# Patient Record
Sex: FEMALE | Race: WHITE | NOT HISPANIC OR LATINO | Employment: FULL TIME | URBAN - METROPOLITAN AREA
[De-identification: names, ages, dates, MRNs, and addresses within clinical notes are randomized per-mention and may not be internally consistent; named-entity substitution may affect disease eponyms.]

---

## 2020-09-23 ENCOUNTER — TRANSCRIBE ORDERS (OUTPATIENT)
Dept: ADMINISTRATIVE | Facility: HOSPITAL | Age: 55
End: 2020-09-23

## 2020-09-23 DIAGNOSIS — Z12.31 ENCOUNTER FOR SCREENING MAMMOGRAM FOR MALIGNANT NEOPLASM OF BREAST: Primary | ICD-10-CM

## 2020-09-28 ENCOUNTER — OFFICE VISIT (OUTPATIENT)
Dept: URGENT CARE | Facility: CLINIC | Age: 55
End: 2020-09-28
Payer: COMMERCIAL

## 2020-09-28 VITALS
HEIGHT: 65 IN | HEART RATE: 73 BPM | TEMPERATURE: 97.5 F | DIASTOLIC BLOOD PRESSURE: 85 MMHG | OXYGEN SATURATION: 97 % | WEIGHT: 194 LBS | BODY MASS INDEX: 32.32 KG/M2 | SYSTOLIC BLOOD PRESSURE: 157 MMHG | RESPIRATION RATE: 18 BRPM

## 2020-09-28 DIAGNOSIS — L30.9 ECZEMA OF BOTH HANDS: Primary | ICD-10-CM

## 2020-09-28 PROCEDURE — 99213 OFFICE O/P EST LOW 20 MIN: CPT | Performed by: PREVENTIVE MEDICINE

## 2020-09-28 RX ORDER — ELECTROLYTES/DEXTROSE
SOLUTION, ORAL ORAL
COMMUNITY

## 2020-09-28 RX ORDER — NICOTINE POLACRILEX 2 MG
1 GUM BUCCAL
COMMUNITY

## 2020-09-28 RX ORDER — TRIAMCINOLONE ACETONIDE 1 MG/G
CREAM TOPICAL 2 TIMES DAILY
Qty: 30 G | Refills: 0 | Status: SHIPPED | OUTPATIENT
Start: 2020-09-28

## 2020-09-28 RX ORDER — CEPHALEXIN 500 MG/1
500 CAPSULE ORAL EVERY 8 HOURS SCHEDULED
Qty: 15 CAPSULE | Refills: 0 | Status: SHIPPED | OUTPATIENT
Start: 2020-09-28 | End: 2020-10-03

## 2020-09-28 NOTE — PROGRESS NOTES
[facility]        NAME: Faisal Guerrero is a 63 y o  female  : 1965    MRN: [id number]  DATE: 2020  TIME: 5:16 PM    Assessment and Plan   Eczema of both hands [L30 9]  1  Eczema of both hands  triamcinolone (KENALOG) 0 1 % cream    cephalexin (KEFLEX) 500 mg capsule         Patient Instructions       Follow up with PCP in 3-5 days  Proceed to  ER if symptoms worsen  Chief Complaint     Chief Complaint   Patient presents with    blister     blister on L middle finger x 3days  Unable to bend finger  Draining yellowish pus earlier today  History of Present Illness       Blistering rash on both hands times 5-10 days  History of eczema in the past       Review of Systems   Review of Systems   Skin: Positive for rash  Current Medications       Current Outpatient Medications:     Biotin 1 MG CAPS, Take 1 mg by mouth, Disp: , Rfl:     Cranberry 475 MG CAPS, Take by mouth, Disp: , Rfl:     Multiple Vitamins-Minerals (Multivitamin Adult) TABS, Take by mouth, Disp: , Rfl:     cephalexin (KEFLEX) 500 mg capsule, Take 1 capsule (500 mg total) by mouth every 8 (eight) hours for 5 days, Disp: 15 capsule, Rfl: 0    triamcinolone (KENALOG) 0 1 % cream, Apply topically 2 (two) times a day, Disp: 30 g, Rfl: 0    Current Allergies     Allergies as of 2020    (No Known Allergies)            The following portions of the patient's history were reviewed and updated as appropriate: allergies, current medications, past family history, past medical history, past social history, past surgical history and problem list      Past Medical History:   Diagnosis Date    Known health problems: none        Past Surgical History:   Procedure Laterality Date     SECTION      WISDOM TOOTH EXTRACTION         Family History   Problem Relation Age of Onset    Hernia Mother          Medications have been verified          Objective   /85   Pulse 73   Temp 97 5 °F (36 4 °C) Resp 18   Ht 5' 5" (1 651 m)   Wt 88 kg (194 lb)   SpO2 97%   BMI 32 28 kg/m²        Physical Exam     Physical Exam  Skin:     Comments: The rash on both hands and fingers consists of several bull eye, looks like small ruptured bulla, and dry erythematous papular lesions    See attached pictures

## 2020-09-29 ENCOUNTER — HOSPITAL ENCOUNTER (OUTPATIENT)
Dept: RADIOLOGY | Facility: HOSPITAL | Age: 55
Discharge: HOME | End: 2020-09-29
Attending: RADIOLOGY

## 2020-09-29 DIAGNOSIS — Z71.2 PERSON CONSULTING FOR EXPLANATION OF EXAMINATION OR TEST FINDING: ICD-10-CM

## 2020-10-05 ENCOUNTER — HOSPITAL ENCOUNTER (OUTPATIENT)
Dept: RADIOLOGY | Facility: CLINIC | Age: 55
Discharge: HOME | End: 2020-10-05
Payer: COMMERCIAL

## 2020-10-05 VITALS — WEIGHT: 194 LBS | HEIGHT: 65 IN | BODY MASS INDEX: 32.32 KG/M2

## 2020-10-05 DIAGNOSIS — Z12.31 ENCOUNTER FOR SCREENING MAMMOGRAM FOR MALIGNANT NEOPLASM OF BREAST: ICD-10-CM

## 2020-10-05 PROCEDURE — 77067 SCR MAMMO BI INCL CAD: CPT

## 2020-10-05 PROCEDURE — 77063 BREAST TOMOSYNTHESIS BI: CPT

## 2021-07-06 ENCOUNTER — HOSPITAL ENCOUNTER (EMERGENCY)
Facility: HOSPITAL | Age: 56
Discharge: HOME | End: 2021-07-06
Attending: EMERGENCY MEDICINE | Admitting: EMERGENCY MEDICINE
Payer: COMMERCIAL

## 2021-07-06 VITALS
OXYGEN SATURATION: 97 % | WEIGHT: 190.26 LBS | BODY MASS INDEX: 31.7 KG/M2 | DIASTOLIC BLOOD PRESSURE: 70 MMHG | TEMPERATURE: 98.1 F | HEART RATE: 111 BPM | RESPIRATION RATE: 18 BRPM | SYSTOLIC BLOOD PRESSURE: 148 MMHG | HEIGHT: 65 IN

## 2021-07-06 DIAGNOSIS — M79.89 LEG SWELLING: Primary | ICD-10-CM

## 2021-07-06 PROCEDURE — 99283 EMERGENCY DEPT VISIT LOW MDM: CPT

## 2021-07-06 PROCEDURE — 96372 THER/PROPH/DIAG INJ SC/IM: CPT

## 2021-07-06 PROCEDURE — 99284 EMERGENCY DEPT VISIT MOD MDM: CPT | Performed by: EMERGENCY MEDICINE

## 2021-07-06 RX ORDER — GLIMEPIRIDE 2 MG/1
0.5 TABLET ORAL DAILY
COMMUNITY
Start: 2021-05-14

## 2021-07-06 RX ORDER — FUROSEMIDE 20 MG/1
20 TABLET ORAL DAILY PRN
COMMUNITY
Start: 2021-07-02

## 2021-07-06 RX ADMIN — ENOXAPARIN SODIUM 90 MG: 100 INJECTION SUBCUTANEOUS at 20:11

## 2021-07-07 ENCOUNTER — HOSPITAL ENCOUNTER (OUTPATIENT)
Dept: NON INVASIVE DIAGNOSTICS | Facility: HOSPITAL | Age: 56
Discharge: HOME | End: 2021-07-07
Attending: EMERGENCY MEDICINE
Payer: COMMERCIAL

## 2021-07-07 DIAGNOSIS — M79.89 LEG SWELLING: ICD-10-CM

## 2021-07-07 PROCEDURE — 93970 EXTREMITY STUDY: CPT | Performed by: SURGERY

## 2021-07-07 PROCEDURE — 93970 EXTREMITY STUDY: CPT

## 2021-07-07 NOTE — ED PROVIDER NOTES
History  Chief Complaint   Patient presents with    Leg Swelling     Patient reports bilateral leg swelling worse than normal  Also reports redness/tenderness of legs  Patient with prior history of recurrent leg swelling, complains of worsening leg swelling over the past 6 days  She complains of equal bilateral lower extremity swelling without significant pain  She denies chest pain or shortness of breath  She does complain of redness in the bilateral legs as well  History provided by:  Patient   used: No    Medical Problem  Location:  Bilateral lower legs  Quality:  Swelling without pain  Severity:  Moderate  Onset quality:  Gradual  Duration:  6 days  Timing:  Constant  Progression:  Worsening  Chronicity:  New  Relieved by:  Nothing  Worsened by:  Nothing  Ineffective treatments:  None tried  Associated symptoms: no abdominal pain, no chest pain, no congestion, no cough, no diarrhea, no ear pain, no fever, no headaches, no loss of consciousness, no myalgias, no nausea, no rash, no shortness of breath, no sore throat, no vomiting and no wheezing        Prior to Admission Medications   Prescriptions Last Dose Informant Patient Reported? Taking?    Biotin 1 MG CAPS 7/6/2021 at Unknown time  Yes Yes   Sig: Take 1 mg by mouth   Cranberry 475 MG CAPS 7/6/2021 at Unknown time  Yes Yes   Sig: Take by mouth   Multiple Vitamins-Minerals (Multivitamin Adult) TABS 7/6/2021 at Unknown time  Yes Yes   Sig: Take by mouth   furosemide (LASIX) 20 mg tablet 7/6/2021 at Unknown time  Yes Yes   Sig: Take 20 mg by mouth daily as needed   glimepiride (AMARYL) 2 mg tablet 7/6/2021 at Unknown time  Yes Yes   Sig: Take 2 mg by mouth daily   triamcinolone (KENALOG) 0 1 % cream 7/6/2021 at Unknown time  No Yes   Sig: Apply topically 2 (two) times a day      Facility-Administered Medications: None       Past Medical History:   Diagnosis Date    Diabetes mellitus ([facility] [address] )        Past Surgical History: Procedure Laterality Date     SECTION      WISDOM TOOTH EXTRACTION         Family History   Problem Relation Age of Onset    Hernia Mother     No Known Problems Father     No Known Problems Daughter     No Known Problems Maternal Grandmother     No Known Problems Maternal Grandfather     No Known Problems Paternal Grandmother     No Known Problems Paternal Grandfather     Breast cancer Neg Hx     Breast cancer additional onset Neg Hx     Colon cancer Neg Hx     Endometrial cancer Neg Hx     Ovarian cancer Neg Hx     BRCA 1/2 Neg Hx     BRCA1 Negative Neg Hx     BRCA1 Positive Neg Hx     BRCA2 Negative Neg Hx     BRCA2 Positive Neg Hx      I have reviewed and agree with the history as documented  E-Cigarette/Vaping    E-Cigarette Use Never User      E-Cigarette/Vaping Substances     Social History     Tobacco Use    Smoking status: Never Smoker    Smokeless tobacco: Never Used   Vaping Use    Vaping Use: Never used   Substance Use Topics    Alcohol use: Not Currently    Drug use: Never       Review of Systems   Constitutional: Negative for chills and fever  HENT: Negative for congestion, ear pain, hearing loss, sore throat, trouble swallowing and voice change  Eyes: Negative for pain and discharge  Respiratory: Negative for cough, shortness of breath and wheezing  Cardiovascular: Positive for leg swelling  Negative for chest pain and palpitations  Gastrointestinal: Negative for abdominal pain, blood in stool, constipation, diarrhea, nausea and vomiting  Genitourinary: Negative for dysuria, flank pain, frequency and hematuria  Musculoskeletal: Negative for joint swelling, myalgias, neck pain and neck stiffness  Skin: Negative for rash and wound  Neurological: Negative for dizziness, seizures, loss of consciousness, syncope, facial asymmetry and headaches  Psychiatric/Behavioral: Negative for hallucinations, self-injury and suicidal ideas     All other systems reviewed and are negative  Physical Exam  Physical Exam  Vitals and nursing note reviewed  Constitutional:       General: She is not in acute distress  Appearance: She is well-developed  HENT:      Head: Normocephalic and atraumatic  Right Ear: External ear normal       Left Ear: External ear normal    Eyes:      General: No scleral icterus  Right eye: No discharge  Left eye: No discharge  Extraocular Movements: Extraocular movements intact  Conjunctiva/sclera: Conjunctivae normal    Cardiovascular:      Rate and Rhythm: Normal rate and regular rhythm  Heart sounds: Normal heart sounds  No murmur heard  Pulmonary:      Effort: Pulmonary effort is normal       Breath sounds: Normal breath sounds  No wheezing or rales  Abdominal:      General: Bowel sounds are normal  There is no distension  Palpations: Abdomen is soft  Tenderness: There is no abdominal tenderness  There is no guarding or rebound  Musculoskeletal:         General: No deformity  Normal range of motion  Cervical back: Normal range of motion and neck supple  Right lower leg: Edema present  Left lower leg: Edema present  Comments: Bilateral lower extremity swelling from ankles to mid calf  Associated venous stasis skin changes  Bilateral dorsalis pedis and posterior tibialis pulses are normal   Distal neurovascular function is normal    Skin:     General: Skin is warm and dry  Findings: No rash  Neurological:      General: No focal deficit present  Mental Status: She is alert and oriented to person, place, and time  Cranial Nerves: No cranial nerve deficit  Psychiatric:         Mood and Affect: Mood normal          Behavior: Behavior normal          Thought Content:  Thought content normal          Judgment: Judgment normal          Vital Signs  ED Triage Vitals [07/06/21 1957]   Temperature Pulse Respirations Blood Pressure SpO2   98 1 °F (36 7 °C) (!) 111 18 148/70 97 %      Temp Source Heart Rate Source Patient Position - Orthostatic VS BP Location FiO2 (%)   Temporal Monitor Lying Right arm --      Pain Score       --           Vitals:    07/06/21 1957   BP: 148/70   Pulse: (!) 111   Patient Position - Orthostatic VS: Lying         Visual Acuity      ED Medications  Medications   enoxaparin (LOVENOX) subcutaneous injection 90 mg (has no administration in time range)       Diagnostic Studies  Results Reviewed     None                 VAS lower limb venous duplex study, complete bilateral    (Results Pending)              Procedures  Procedures         ED Course                                           MDM  Number of Diagnoses or Management Options  Diagnosis management comments: Patient had outpatient blood work performed on 07/02 revealed normal creatinine  Patient is appropriate for dose of Lovenox in the emergency department, outpatient scheduling for [facility] [place] the [place]  Jojo Mondragon Patient is agreeable with this plan  Message has been left for the vascular lab who will contact the patient to arrange for the testing  Disposition  Final diagnoses:   Leg swelling     Time reflects when diagnosis was documented in both MDM as applicable and the Disposition within this note     Time User Action Codes Description Comment    7/6/2021  8:08 PM Cleveland Lacey Add [M79 89] Leg swelling       ED Disposition     ED Disposition Condition Date/Time Comment    Discharge Stable Tue Jul 6, 2021  8:08 PM Yosi Kidd discharge to home/self care  Follow-up Information     Follow up With Specialties Details Why Contact Info    Your primary care physician   As needed           Patient's Medications   Discharge Prescriptions    No medications on file     Outpatient Discharge Orders   VAS lower limb venous duplex study, complete bilateral   Standing Status: Future Standing Exp   Date: 07/06/25       PDMP Review     None          ED Provider  Electronically Signed by           Donnie Koo MD  07/06/21 2010

## 2021-09-11 ENCOUNTER — APPOINTMENT (EMERGENCY)
Dept: RADIOLOGY | Facility: HOSPITAL | Age: 56
End: 2021-09-11
Payer: COMMERCIAL

## 2021-09-11 ENCOUNTER — HOSPITAL ENCOUNTER (EMERGENCY)
Facility: HOSPITAL | Age: 56
Discharge: HOME | End: 2021-09-11
Attending: EMERGENCY MEDICINE
Payer: COMMERCIAL

## 2021-09-11 VITALS
BODY MASS INDEX: 32.49 KG/M2 | TEMPERATURE: 97.1 F | SYSTOLIC BLOOD PRESSURE: 137 MMHG | WEIGHT: 195 LBS | HEIGHT: 65 IN | OXYGEN SATURATION: 98 % | HEART RATE: 81 BPM | RESPIRATION RATE: 12 BRPM | DIASTOLIC BLOOD PRESSURE: 80 MMHG

## 2021-09-11 DIAGNOSIS — M25.562 ACUTE PAIN OF LEFT KNEE: Primary | ICD-10-CM

## 2021-09-11 PROCEDURE — 73564 X-RAY EXAM KNEE 4 OR MORE: CPT

## 2021-09-11 PROCEDURE — 99283 EMERGENCY DEPT VISIT LOW MDM: CPT

## 2021-09-11 PROCEDURE — 99284 EMERGENCY DEPT VISIT MOD MDM: CPT | Performed by: PHYSICIAN ASSISTANT

## 2021-09-11 PROCEDURE — 20610 DRAIN/INJ JOINT/BURSA W/O US: CPT | Performed by: PHYSICIAN ASSISTANT

## 2021-09-11 RX ORDER — NAPROXEN 500 MG/1
500 TABLET ORAL 2 TIMES DAILY WITH MEALS
Qty: 14 TABLET | Refills: 0 | Status: SHIPPED | OUTPATIENT
Start: 2021-09-11 | End: 2021-09-18

## 2021-09-11 RX ORDER — LIDOCAINE HYDROCHLORIDE 10 MG/ML
10 INJECTION, SOLUTION EPIDURAL; INFILTRATION; INTRACAUDAL; PERINEURAL ONCE
Status: COMPLETED | OUTPATIENT
Start: 2021-09-11 | End: 2021-09-11

## 2021-09-11 RX ADMIN — LIDOCAINE HYDROCHLORIDE 10 ML: 10 INJECTION, SOLUTION EPIDURAL; INFILTRATION; INTRACAUDAL; PERINEURAL at 13:01

## 2021-09-11 NOTE — Clinical Note
Katie Montoya was seen and treated in our emergency department on 9/11/2021  No work until cleared by Family Doctor/Orthopedics        Diagnosis:     [address]  is off the rest of the shift today  She may return on this date: If you have any questions or concerns, please don't hesitate to call        Jitendra Lr PA-C    ______________________________           _______________          _______________  Hospital Representative                              Date                                Time

## 2021-09-11 NOTE — Clinical Note
Leoncio Schafer was seen and treated in our emergency department on 9/11/2021  No work until cleared by Family Doctor/Orthopedics        Diagnosis:     Maribel Marquis  is off the rest of the shift today  She may return on this date: If you have any questions or concerns, please don't hesitate to call        Sonia Sharma PA-C    ______________________________           _______________          _______________  Hospital Representative                              Date                                Time

## 2021-09-11 NOTE — ED PROVIDER NOTES
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, and while carrying several boxes she tripped over a curb falling and landing on left knee  Patient denies any head injury or loss of consciousness  Patient states she is able to get of the fall with slight pain in her left knee  She states that she sat down for several hours at the event then proceeded to get up to help with the site of a canopy however noticed she had worsening pain with ambulation over left knee and severe swelling of her left knee  Knee Pain  Location:  Knee  Time since incident:  3 hours  Injury: yes    Mechanism of injury: fall    Fall:     Fall occurred:  Tripped    Impact surface:  [place]    Point of impact:  Knees    Entrapped after fall: no    Knee location:  L knee  Pain details:     Quality:  Pressure and sharp    Radiates to:  Does not radiate    Severity:  Moderate    Onset quality:  Gradual    Timing:  Constant    Progression:  Worsening  Chronicity:  New  Dislocation: no    Foreign body present:  No foreign bodies  Tetanus status:  Unknown  Prior injury to area:  No  Relieved by:  Rest  Worsened by:  Bearing weight  Ineffective treatments:  Immobilization  Associated symptoms: swelling    Associated symptoms: no back pain, no decreased ROM, no fatigue, no itching, no muscle weakness, no neck pain and no stiffness    Swelling:     Location:  Leg    Onset quality:  Gradual    Timing:  Constant    Progression:  Worsening    Chronicity:  New  Risk factors: no known bone disorder and no recent illness        Prior to Admission Medications   Prescriptions Last Dose Informant Patient Reported? Taking?    Biotin 1 MG CAPS   Yes No   Sig: Take 1 mg by mouth   Cranberry 475 MG CAPS   Yes No   Sig: Take by mouth   Multiple Vitamins-Minerals (Multivitamin Adult) TABS   Yes No   Sig: Take by mouth   furosemide (LASIX) 20 mg tablet   Yes No   Sig: Take 20 mg by mouth daily as needed   glimepiride (AMARYL) 2 mg tablet   Yes No   Sig: Take 2 mg by mouth daily   triamcinolone (KENALOG) 0 1 % cream   No No   Sig: Apply topically 2 (two) times a day      Facility-Administered Medications: None       Past Medical History:   Diagnosis Date    Diabetes mellitus ([facility] [address] )        Past Surgical History:   Procedure Laterality Date     SECTION      WISDOM TOOTH EXTRACTION         Family History   Problem Relation Age of Onset    Hernia Mother     No Known Problems Father     No Known Problems Daughter     No Known Problems Maternal Grandmother     No Known Problems Maternal Grandfather     No Known Problems Paternal Grandmother     No Known Problems Paternal Grandfather     Breast cancer Neg Hx     Breast cancer additional onset Neg Hx     Colon cancer Neg Hx     Endometrial cancer Neg Hx     Ovarian cancer Neg Hx     BRCA 1/2 Neg Hx     BRCA1 Negative Neg Hx     BRCA1 Positive Neg Hx     BRCA2 Negative Neg Hx     BRCA2 Positive Neg Hx      I have reviewed and agree with the history as documented  E-Cigarette/Vaping    E-Cigarette Use Never User      E-Cigarette/Vaping Substances     Social History     Tobacco Use    Smoking status: Never Smoker    Smokeless tobacco: Never Used   Vaping Use    Vaping Use: Never used   Substance Use Topics    Alcohol use: Not Currently    Drug use: Never       Review of Systems   Constitutional: Negative for fatigue  Musculoskeletal: Negative for back pain, neck pain and stiffness  Skin: Negative for itching  All other systems reviewed and are negative  Physical Exam  Physical Exam  Vitals and nursing note reviewed  Constitutional:       General: She is not in acute distress       Appearance: She is well-developed  HENT:      Head: Normocephalic and atraumatic  Mouth/Throat:      Mouth: Mucous membranes are moist    Eyes:      Extraocular Movements: Extraocular movements intact  Conjunctiva/sclera: Conjunctivae normal       Pupils: Pupils are equal, round, and reactive to light  Cardiovascular:      Rate and Rhythm: Normal rate and regular rhythm  Heart sounds: No murmur heard  Pulmonary:      Effort: Pulmonary effort is normal  No respiratory distress  Breath sounds: Normal breath sounds  Abdominal:      Palpations: Abdomen is soft  Tenderness: There is no abdominal tenderness  There is no right CVA tenderness or left CVA tenderness  Musculoskeletal:      Cervical back: Neck supple  Left upper leg: Normal       Left knee: Swelling and bony tenderness present  Tenderness present over the medial joint line and lateral joint line  Left lower leg: Normal       Left ankle: Normal    Skin:     General: Skin is warm and dry  Capillary Refill: Capillary refill takes less than 2 seconds  Neurological:      General: No focal deficit present  Mental Status: She is alert and oriented to person, place, and time           Vital Signs  ED Triage Vitals [09/11/21 1156]   Temperature Pulse Respirations Blood Pressure SpO2   (!) 97 1 °F (36 2 °C) 78 16 129/76 98 %      Temp Source Heart Rate Source Patient Position - Orthostatic VS BP Location FiO2 (%)   Temporal Monitor -- Left arm --      Pain Score       2           Vitals:    09/11/21 1156   BP: 129/76   Pulse: 78         Visual Acuity      ED Medications  Medications   lidocaine (PF) (XYLOCAINE-MPF) 1 % injection 10 mL (has no administration in time range)       Diagnostic Studies  Results Reviewed     None                 XR knee 4+ vw left injury   ED Interpretation by Yanick Sanderson PA-C (09/11 1246)   No acute fractures                 Procedures  Arthrocentesis    Date/Time: 9/11/2021 12:48 PM  Performed by: Vani Bowie PA-C  Authorized by: Vani Bowie PA-C     Location:  Bedside  Verbal consent obtained?: Yes    Written consent obtained?: No    Risks and benefits: Risks, benefits and alternatives were discussed    Consent given by:  Parent  Patient states understanding of procedure being performed: Yes    Patient identity confirmed:  Verbally with patient  Time out: Immediately prior to the procedure a time out was called    Indications:  Joint swelling, pain and therapeutic  Body area:  Knee  Joint:  Left knee  Local anesthesia used?: Yes    Anesthesia:  Local infiltration  Local anesthetic:  Lidocaine 1% without epinephrine  Anesthetic total (ml):  5  Preparation: Patient was prepped and draped in usual sterile fashion    Needle size:  18 G  Ultrasound guidance: No    Approach:  Lateral  Aspirate amount (ml):  40  Aspirate:  Bloody  Patient tolerance:  Patient tolerated the procedure well with no immediate complications             ED Course                             SBIRT 22yo+      Most Recent Value   SBIRT (23 yo +)   In order to provide better care to our patients, we are screening all of our patients for alcohol and drug use  Would it be okay to ask you these screening questions? Yes Filed at: 09/11/2021 1155   Initial Alcohol Screen: US AUDIT-C    1  How often do you have a drink containing alcohol?  0 Filed at: 09/11/2021 1155   2  How many drinks containing alcohol do you have on a typical day you are drinking? 0 Filed at: 09/11/2021 1155   3a  Male UNDER 65: How often do you have five or more drinks on one occasion? 0 Filed at: 09/11/2021 1155   3b  FEMALE Any Age, or MALE 65+: How often do you have 4 or more drinks on one occassion? 0 Filed at: 09/11/2021 1155   Audit-C Score  0 Filed at: 09/11/2021 1155   RANDAL: How many times in the past year have you    Used an illegal drug or used a prescription medication for non-medical reasons?   Never Filed at: 09/11/2021 200                    [facility]  Number of Diagnoses or Management Options  Acute pain of left knee  Diagnosis management comments: Patient's x-ray unremarkable  Patient was placed in knee immobilizer given walker for symptomatic relief  Patient was offered arthrocentesis for pain relief and swelling  Patient consented for procedure, was instructed about risks including pain and infection  Patient had grossly bloody synovial fluid aspirated which was consistent with meniscal or ligamentous injury  Patient was instructed to follow-up with orthopedics  She expressed understanding was agreement treatment plan  Amount and/or Complexity of Data Reviewed  Tests in the radiology section of CPT®: ordered and reviewed  Decide to obtain previous medical records or to obtain history from someone other than the patient: yes  Review and summarize past medical records: yes  Independent visualization of images, tracings, or specimens: yes    Risk of Complications, Morbidity, and/or Mortality  Presenting problems: low  Diagnostic procedures: low  Management options: low    Patient Progress  Patient progress: stable      Disposition  Final diagnoses:   Acute pain of left knee     Time reflects when diagnosis was documented in both MDM as applicable and the Disposition within this note     Time User Action Codes Description Comment    9/11/2021 12:42 PM Beltran Reynolds [M25 562] Acute pain of left knee       ED Disposition     ED Disposition Condition Date/Time Comment    Discharge Stable Sat Sep 11, 2021 12:47 PM Randolph Alonzo discharge to home/self care              Follow-up Information     Follow up With Specialties Details Why [address] [facility] Schedule an appointment as soon as possible for a visit   [facility] Shira Allen  [phone]            Patient's Medications   Discharge Prescriptions    NAPROXEN (NAPROSYN) 500 MG TABLET    Take 1 tablet (500 mg total) by mouth 2 (two) times a day with meals for 7 days       Start Date: 9/11/2021 End Date: 9/18/2021       Order Dose: 500 mg       Quantity: 14 tablet    Refills: 0         PDMP Review     None          ED Provider  Electronically Signed by           Jose Juan Nogueira PA-C  09/11/21 5589

## 2021-09-16 ENCOUNTER — OFFICE VISIT (OUTPATIENT)
Dept: OBGYN CLINIC | Facility: CLINIC | Age: 56
End: 2021-09-16
Payer: COMMERCIAL

## 2021-09-16 VITALS
HEIGHT: 65 IN | BODY MASS INDEX: 32.49 KG/M2 | SYSTOLIC BLOOD PRESSURE: 138 MMHG | HEART RATE: 90 BPM | WEIGHT: 195 LBS | DIASTOLIC BLOOD PRESSURE: 82 MMHG | TEMPERATURE: 98 F

## 2021-09-16 DIAGNOSIS — Z12.31 ENCOUNTER FOR SCREENING MAMMOGRAM FOR MALIGNANT NEOPLASM OF BREAST: ICD-10-CM

## 2021-09-16 DIAGNOSIS — M25.562 ACUTE PAIN OF LEFT KNEE: ICD-10-CM

## 2021-09-16 DIAGNOSIS — S80.02XA CONTUSION OF LEFT KNEE, INITIAL ENCOUNTER: Primary | ICD-10-CM

## 2021-09-16 PROCEDURE — 99203 OFFICE O/P NEW LOW 30 MIN: CPT | Performed by: ORTHOPAEDIC SURGERY

## 2021-09-16 RX ORDER — METFORMIN HYDROCHLORIDE 750 MG/1
TABLET, EXTENDED RELEASE ORAL
COMMUNITY
Start: 2021-07-19

## 2021-09-16 NOTE — LETTER
September 16, 2021     Patient: Liyah Solorzano   YOB: 1965   Date of Visit: 9/16/2021       To Whom it May Concern:    Liyah Solorzano is under my professional care  She was seen in my office on 9/16/2021  She may not return to work until she is rechecked in 1 week  Further determination will be made at that time  If you have any questions or concerns, please don't hesitate to call           Sincerely,          Chloe Wolfe        CC: No Recipients

## 2021-09-16 NOTE — PROGRESS NOTES
ASSESSMENT/PLAN:    Diagnoses and all orders for this visit:    Contusion of left knee, initial encounter  -     T-Rom  Post Op Knee Brace    Acute pain of left knee  -     Ambulatory referral to Orthopedic Surgery  -     T-Rom  Post Op Knee Brace    Other orders  -     metFORMIN (GLUCOPHAGE-XR) 750 mg 24 hr tablet        Plan: Treatment options were discussed  I think she would best be served with a T ROM brace allowing some range of motion but providing stability due to the anterior knee pain  She is to use crutches as needed but may wean from the crutches if she tolerates  The brace may be removed for periods of inactivity and she is permitted to flex her knee within the brace during inactivity  During ambulation, I have recommended that the brace be locked in extension  I will see her in 1 week for re-evaluation  The office should be contacted in the interim if questions or concerns arise  She was provided with a note that she should remain out of work  No follow-ups on file       _____________________________________________________  CHIEF COMPLAINT:  Chief Complaint   Patient presents with    Left Knee - Follow-up         SUBJECTIVE:  Shemar Issa is a 61y o  year old female who presents for evaluation of her left lower extremity injured when she fell on 09/11/2021  She states that 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, stumbled, tripping over a curb and landing striking her left knee  She had difficulty bearing weight and was seen in the emergency room at [facility]  X-rays were obtained and she was provided with a knee immobilizer  She was referred for orthopedic consultation and treatment  She notes persistent left lower extremity pain but has seen some improvement with the knee immobilizer  She denies paresthesias  She denies additional injuries  She denies head trauma or loss of consciousness        PAST MEDICAL HISTORY:  Past Medical History:   Diagnosis Date    Diabetes mellitus ([facility] [address] )        PAST SURGICAL HISTORY:  Past Surgical History:   Procedure Laterality Date     SECTION      WISDOM TOOTH EXTRACTION         FAMILY HISTORY:  Family History   Problem Relation Age of Onset    Hernia Mother     No Known Problems Father     No Known Problems Daughter     No Known Problems Maternal Grandmother     No Known Problems Maternal Grandfather     No Known Problems Paternal Grandmother     No Known Problems Paternal Grandfather     Breast cancer Neg Hx     Breast cancer additional onset Neg Hx     Colon cancer Neg Hx     Endometrial cancer Neg Hx     Ovarian cancer Neg Hx     BRCA 1/2 Neg Hx     BRCA1 Negative Neg Hx     BRCA1 Positive Neg Hx     BRCA2 Negative Neg Hx     BRCA2 Positive Neg Hx        SOCIAL HISTORY:  Social History     Tobacco Use    Smoking status: Never Smoker    Smokeless tobacco: Never Used   Vaping Use    Vaping Use: Never used   Substance Use Topics    Alcohol use: Not Currently    Drug use: Never       MEDICATIONS:    Current Outpatient Medications:     Biotin 1 MG CAPS, Take 1 mg by mouth, Disp: , Rfl:     Cranberry 475 MG CAPS, Take by mouth, Disp: , Rfl:     glimepiride (AMARYL) 2 mg tablet, Take 2 mg by mouth daily, Disp: , Rfl:     metFORMIN (GLUCOPHAGE-XR) 750 mg 24 hr tablet, , Disp: , Rfl:     Multiple Vitamins-Minerals (Multivitamin Adult) TABS, Take by mouth, Disp: , Rfl:     naproxen (NAPROSYN) 500 mg tablet, Take 1 tablet (500 mg total) by mouth 2 (two) times a day with meals for 7 days, Disp: 14 tablet, Rfl: 0    triamcinolone (KENALOG) 0 1 % cream, Apply topically 2 (two) times a day, Disp: 30 g, Rfl: 0    furosemide (LASIX) 20 mg tablet, Take 20 mg by mouth daily as needed (Patient not taking: Reported on 2021), Disp: , Rfl:     ALLERGIES:  No Known Allergies    Review of systems:   Constitutional: Negative for fatigue, fever or loss of apetite  HENT: Negative      Respiratory: Negative for shortness of breath, dyspnea  Cardiovascular: Negative for chest pain/tightness  Gastrointestinal: Negative for abdominal pain, N/V  Endocrine: Negative for cold/heat intolerance, unexplained weight loss/gain  Genitourinary: Negative for flank pain, dysuria, hematuria  Musculoskeletal:  Positive as in the HPI   Skin: Negative for rash  Neurological:  Negative  Psychiatric/Behavioral: Negative for agitation  _____________________________________________________  PHYSICAL EXAMINATION:    Blood pressure 138/82, pulse 90, temperature 98 °F (36 7 °C), height 5' 5" (1 651 m), weight 88 5 kg (195 lb)  General: well developed and well nourished, alert, oriented times 3 and appears comfortable, ambulating with crutches and a knee immobilizer left lower extremity  Psychiatric: Normal  HEENT: Benign  Cardiovascular: Regular    Pulmonary: No wheezing or stridor  Abdomen: Soft, Nontender  Skin: No masses, erythema, lacerations, fluctation, ulcerations  Neurovascular: Motor and sensory exams are grossly intact and pulses are palpable  MUSCULOSKELETAL EXAMINATION:    The left lower extremity exam demonstrates the immobilizer in place  This was removed without difficulty  There is significant ecchymosis and swelling noted from the knee distally  She was able to independently straight leg raise and maintain the knee in full extension for several seconds without significant pain  She does complain of tenderness over the anterior aspect of the lower leg from the knee distally to the mid calf with the greatest degree of tenderness noted at the patellar tendon level  The quadricep is nontender  She does have some tenderness over the patella  Collateral ligaments are stable  Knee range of motion was limited to no more than about 30° of flexion secondary to anterior knee pain    Full assessment of cruciate ligaments and of meniscal tissue was not possible due to the patient's limited mobility and tolerance       _____________________________________________________  STUDIES REVIEWED:  X-rays of her knee demonstrate no evidence of acute abnormalities  The report was reviewed  Her ER note was reviewed          Cindy Sharma

## 2021-09-24 ENCOUNTER — OFFICE VISIT (OUTPATIENT)
Dept: OBGYN CLINIC | Facility: CLINIC | Age: 56
End: 2021-09-24
Payer: COMMERCIAL

## 2021-09-24 VITALS
TEMPERATURE: 97.3 F | DIASTOLIC BLOOD PRESSURE: 70 MMHG | HEIGHT: 65 IN | BODY MASS INDEX: 32.49 KG/M2 | HEART RATE: 89 BPM | SYSTOLIC BLOOD PRESSURE: 110 MMHG | WEIGHT: 195 LBS

## 2021-09-24 DIAGNOSIS — S80.02XA CONTUSION OF LEFT KNEE, INITIAL ENCOUNTER: Primary | ICD-10-CM

## 2021-09-24 PROCEDURE — 99213 OFFICE O/P EST LOW 20 MIN: CPT | Performed by: ORTHOPAEDIC SURGERY

## 2021-09-24 NOTE — LETTER
September 24, 2021     Patient: Marylou Couch   YOB: 1965   Date of Visit: 9/24/2021       To Whom it May Concern:    Marylou Couch is under my professional care  She was seen in my office on 9/24/2021  She may return to work on 9/25/21 with limited duty restrictions  No squatting, stooping, kneeling with the left lower extremity  No work on elevated structures  No lifting more than 10 lbs  Must be permitted to wear the brace during her shift  If you have any questions or concerns, please don't hesitate to call           Sincerely,          Chetan Gonzales        CC: No Recipients

## 2021-09-24 NOTE — PROGRESS NOTES
ASSESSMENT/PLAN:    Diagnoses and all orders for this visit:    Contusion of left knee, initial encounter        Peter Torres was provided home exercises to begin working on  She will continue to wear the brace when OOB  She may remove it for sleeping and bathing  She was provided a note for work allowing her to return with restrictions  We will see her back in 2 weeks for recheck  She was encouraged to contact the office should questions or concerns arise  Return in about 2 weeks (around 10/8/2021)  _____________________________________________________  CHIEF COMPLAINT:  Chief Complaint   Patient presents with    Follow-up         SUBJECTIVE:  Corina Mishra is a 61 y o  female who presents for follow up of left knee contusion  She reports some improvement in her symptoms  She has been wearing the brace unlocked at all times and denies any instability in her knee  She has not been taking anything for pain  She denies new injuries or trauma         PAST MEDICAL HISTORY:  Past Medical History:   Diagnosis Date    Diabetes mellitus ([facility] [address] )        PAST SURGICAL HISTORY:  Past Surgical History:   Procedure Laterality Date     SECTION      WISDOM TOOTH EXTRACTION         FAMILY HISTORY:  Family History   Problem Relation Age of Onset    Hernia Mother     No Known Problems Father     No Known Problems Daughter     No Known Problems Maternal Grandmother     No Known Problems Maternal Grandfather     No Known Problems Paternal Grandmother     No Known Problems Paternal Grandfather     Breast cancer Neg Hx     Breast cancer additional onset Neg Hx     Colon cancer Neg Hx     Endometrial cancer Neg Hx     Ovarian cancer Neg Hx     BRCA 1/2 Neg Hx     BRCA1 Negative Neg Hx     BRCA1 Positive Neg Hx     BRCA2 Negative Neg Hx     BRCA2 Positive Neg Hx        SOCIAL HISTORY:  Social History     Tobacco Use    Smoking status: Never Smoker    Smokeless tobacco: Never Used   Vaping Use    Vaping Use: Never used   Substance Use Topics    Alcohol use: Not Currently    Drug use: Never       MEDICATIONS:    Current Outpatient Medications:     Biotin 1 MG CAPS, Take 1 mg by mouth, Disp: , Rfl:     Cranberry 475 MG CAPS, Take by mouth, Disp: , Rfl:     glimepiride (AMARYL) 2 mg tablet, Take 2 mg by mouth daily, Disp: , Rfl:     metFORMIN (GLUCOPHAGE-XR) 750 mg 24 hr tablet, , Disp: , Rfl:     Multiple Vitamins-Minerals (Multivitamin Adult) TABS, Take by mouth, Disp: , Rfl:     triamcinolone (KENALOG) 0 1 % cream, Apply topically 2 (two) times a day, Disp: 30 g, Rfl: 0    furosemide (LASIX) 20 mg tablet, Take 20 mg by mouth daily as needed (Patient not taking: Reported on 9/16/2021), Disp: , Rfl:     naproxen (NAPROSYN) 500 mg tablet, Take 1 tablet (500 mg total) by mouth 2 (two) times a day with meals for 7 days (Patient not taking: Reported on 9/24/2021), Disp: 14 tablet, Rfl: 0    ALLERGIES:  No Known Allergies    REVIEW OF SYSTEMS:  Pertinent items are noted in HPI  A comprehensive review of systems was negative       _____________________________________________________  PHYSICAL EXAMINATION:  General: well developed and well nourished, alert, oriented times 3 and appears comfortable  Psychiatric: Normal  HEENT:  Normocephalic, atraumatic  Cardiovascular:  Regular  Pulmonary: No wheezing or stridor  Skin: No masses, erthema, lacerations, fluctation, ulcerations  Neurovascular: Motor and sensory exams are grossly intact  Distal pulses are palpable  Limb is warm and well perfused with good color and capillary refill  MUSCULOSKELETAL EXAMINATION:    The left knee exam demonstrates the brace in place  Skin intact, with resolving ecchymosis and residual swelling  She has full extension with flexion to about 90 degrees without any significant pain  Tenderness over the patella  Stable to varus to valgus stress, cruciate ligaments are grossly stable   The remainder of the left lower extremity exam is benign  _____________________________________________________  STUDIES REVIEWED:  No new imaging performed today    PROCEDURES PERFORMED:   Procedures  None performed today            Jyothi Steven PA-C

## 2021-10-06 ENCOUNTER — HOSPITAL ENCOUNTER (OUTPATIENT)
Dept: RADIOLOGY | Facility: CLINIC | Age: 56
Discharge: HOME | End: 2021-10-06
Payer: COMMERCIAL

## 2021-10-06 VITALS — BODY MASS INDEX: 32.49 KG/M2 | HEIGHT: 65 IN | WEIGHT: 195 LBS

## 2021-10-06 DIAGNOSIS — Z12.31 ENCOUNTER FOR SCREENING MAMMOGRAM FOR MALIGNANT NEOPLASM OF BREAST: ICD-10-CM

## 2021-10-06 PROCEDURE — 77063 BREAST TOMOSYNTHESIS BI: CPT

## 2021-10-06 PROCEDURE — 77067 SCR MAMMO BI INCL CAD: CPT

## 2021-10-08 ENCOUNTER — OFFICE VISIT (OUTPATIENT)
Dept: OBGYN CLINIC | Facility: CLINIC | Age: 56
End: 2021-10-08
Payer: COMMERCIAL

## 2021-10-08 VITALS
DIASTOLIC BLOOD PRESSURE: 82 MMHG | WEIGHT: 195 LBS | BODY MASS INDEX: 32.49 KG/M2 | HEIGHT: 65 IN | SYSTOLIC BLOOD PRESSURE: 128 MMHG | TEMPERATURE: 97.9 F | HEART RATE: 80 BPM

## 2021-10-08 DIAGNOSIS — S80.02XA CONTUSION OF LEFT KNEE, INITIAL ENCOUNTER: Primary | ICD-10-CM

## 2021-10-08 PROCEDURE — 99214 OFFICE O/P EST MOD 30 MIN: CPT | Performed by: ORTHOPAEDIC SURGERY

## 2021-10-08 RX ORDER — METFORMIN HYDROCHLORIDE 750 MG/1
1500 TABLET, EXTENDED RELEASE ORAL
COMMUNITY
Start: 2021-10-01

## 2021-11-01 ENCOUNTER — OFFICE VISIT (OUTPATIENT)
Dept: OBGYN CLINIC | Facility: CLINIC | Age: 56
End: 2021-11-01
Payer: COMMERCIAL

## 2021-11-01 VITALS
HEART RATE: 79 BPM | SYSTOLIC BLOOD PRESSURE: 116 MMHG | TEMPERATURE: 96.4 F | DIASTOLIC BLOOD PRESSURE: 74 MMHG | WEIGHT: 195 LBS | BODY MASS INDEX: 32.49 KG/M2 | HEIGHT: 65 IN

## 2021-11-01 DIAGNOSIS — S80.02XD CONTUSION OF LEFT KNEE, SUBSEQUENT ENCOUNTER: Primary | ICD-10-CM

## 2021-11-01 PROCEDURE — 99213 OFFICE O/P EST LOW 20 MIN: CPT | Performed by: ORTHOPAEDIC SURGERY

## 2022-10-07 ENCOUNTER — HOSPITAL ENCOUNTER (OUTPATIENT)
Dept: RADIOLOGY | Facility: CLINIC | Age: 57
End: 2022-10-07
Payer: COMMERCIAL

## 2022-10-07 VITALS — HEIGHT: 64 IN | WEIGHT: 215 LBS | BODY MASS INDEX: 36.7 KG/M2

## 2022-10-07 DIAGNOSIS — Z12.31 ENCOUNTER FOR SCREENING MAMMOGRAM FOR MALIGNANT NEOPLASM OF BREAST: ICD-10-CM

## 2022-10-07 PROCEDURE — 77063 BREAST TOMOSYNTHESIS BI: CPT

## 2022-10-07 PROCEDURE — 77067 SCR MAMMO BI INCL CAD: CPT

## 2023-07-20 ENCOUNTER — OFFICE VISIT (OUTPATIENT)
Dept: OBGYN CLINIC | Facility: CLINIC | Age: 58
End: 2023-07-20
Payer: COMMERCIAL

## 2023-07-20 VITALS
TEMPERATURE: 97.6 F | DIASTOLIC BLOOD PRESSURE: 82 MMHG | BODY MASS INDEX: 36.54 KG/M2 | HEIGHT: 64 IN | WEIGHT: 214 LBS | SYSTOLIC BLOOD PRESSURE: 140 MMHG | HEART RATE: 90 BPM

## 2023-07-20 DIAGNOSIS — E66.09 CLASS 2 OBESITY DUE TO EXCESS CALORIES WITHOUT SERIOUS COMORBIDITY WITH BODY MASS INDEX (BMI) OF 36.0 TO 36.9 IN ADULT: ICD-10-CM

## 2023-07-20 DIAGNOSIS — G89.29 CHRONIC PAIN OF RIGHT KNEE: Primary | ICD-10-CM

## 2023-07-20 DIAGNOSIS — M17.11 PRIMARY OSTEOARTHRITIS OF RIGHT KNEE: ICD-10-CM

## 2023-07-20 DIAGNOSIS — M25.561 CHRONIC PAIN OF RIGHT KNEE: Primary | ICD-10-CM

## 2023-07-20 PROBLEM — E66.812 CLASS 2 OBESITY DUE TO EXCESS CALORIES WITHOUT SERIOUS COMORBIDITY WITH BODY MASS INDEX (BMI) OF 36.0 TO 36.9 IN ADULT: Status: ACTIVE | Noted: 2023-07-20

## 2023-07-20 PROCEDURE — 99214 OFFICE O/P EST MOD 30 MIN: CPT | Performed by: ORTHOPAEDIC SURGERY

## 2023-07-20 RX ORDER — IBUPROFEN 200 MG
TABLET ORAL EVERY 6 HOURS PRN
COMMUNITY

## 2023-07-20 NOTE — PROGRESS NOTES
ASSESSMENT/PLAN:    Diagnoses and all orders for this visit:    Chronic pain of right knee  -     Ambulatory Referral to Physical Therapy; Future    Primary osteoarthritis of right knee  -     Ambulatory Referral to Physical Therapy; Future    Class 2 obesity due to excess calories without serious comorbidity with body mass index (BMI) of 36.0 to 36.9 in adult    Other orders  -     ibuprofen (MOTRIN) 200 mg tablet; Take by mouth every 6 (six) hours as needed for mild pain        Plan: Treatment was discussed. She did not want to pursue injection at this time as she has seen improvement in her symptoms and did not feel that an injection was warranted. She is agreeable to a course of physical therapy and a prescription was provided. She will continue the over-the-counter analgesics and I will see her in 3 to 4 weeks for reevaluation. She was encouraged to contact me if questions or concerns were to arise prior to follow-up. Return 3 to 4 weeks. _____________________________________________________  CHIEF COMPLAINT:  Chief Complaint   Patient presents with   • Right Knee - Pain         SUBJECTIVE:  Jessica Fine is a 61y.o. year old female who presents for evaluation of right knee pain of approximately 6 to 8-week duration. She denies injury. She has previously had some left knee symptoms which resolved and was seen in this office nearly 2 years ago. She denies any significant complaints referable to her left knee at this time. She does admit that her right knee symptoms have improved recently after a course of oral steroids. She noted a significant degree of difficulty when she was on a Girl  trip and was doing a lot of walking. However, on a more recent vacation she noted less difficulties with walking. She notes primarily activity related pain and pain when standing. She does have some pain at night trying to lie in bed and find a comfortable position.   She denies any significant start up stiffness. She has had no specific treatment other than the tapering dose of steroids and over-the-counter anti-inflammatories. Recent x-rays were obtained after seeing her primary care physician and she was referred for orthopedic consultation and treatment.     PAST MEDICAL HISTORY:  Past Medical History:   Diagnosis Date   • Diabetes mellitus ([address])        PAST SURGICAL HISTORY:  Past Surgical History:   Procedure Laterality Date   •  SECTION     • WISDOM TOOTH EXTRACTION         FAMILY HISTORY:  Family History   Problem Relation Age of Onset   • Hernia Mother    • No Known Problems Father    • No Known Problems Daughter    • No Known Problems Maternal Grandmother    • No Known Problems Maternal Grandfather    • No Known Problems Paternal Grandmother    • No Known Problems Paternal Grandfather    • Breast cancer Neg Hx    • Breast cancer additional onset Neg Hx    • Colon cancer Neg Hx    • Endometrial cancer Neg Hx    • Ovarian cancer Neg Hx    • BRCA 1/2 Neg Hx    • BRCA1 Negative Neg Hx    • BRCA1 Positive Neg Hx    • BRCA2 Negative Neg Hx    • BRCA2 Positive Neg Hx        SOCIAL HISTORY:  Social History     Tobacco Use   • Smoking status: Never   • Smokeless tobacco: Never   Vaping Use   • Vaping Use: Never used   Substance Use Topics   • Alcohol use: Not Currently   • Drug use: Never       MEDICATIONS:    Current Outpatient Medications:   •  Biotin 1 MG CAPS, Take 1 mg by mouth, Disp: , Rfl:   •  Cranberry 475 MG CAPS, Take by mouth, Disp: , Rfl:   •  glimepiride (AMARYL) 2 mg tablet, Take 0.5 mg by mouth daily , Disp: , Rfl:   •  ibuprofen (MOTRIN) 200 mg tablet, Take by mouth every 6 (six) hours as needed for mild pain, Disp: , Rfl:   •  metFORMIN (GLUCOPHAGE-XR) 750 mg 24 hr tablet, , Disp: , Rfl:   •  metFORMIN (GLUCOPHAGE-XR) 750 mg 24 hr tablet, Take 750 mg by mouth, Disp: , Rfl:   •  Multiple Vitamins-Minerals (Multivitamin Adult) TABS, Take by mouth, Disp: , Rfl:   •  furosemide (LASIX) 20 mg tablet, Take 20 mg by mouth daily as needed (Patient not taking: Reported on 9/16/2021), Disp: , Rfl:   •  naproxen (NAPROSYN) 500 mg tablet, Take 1 tablet (500 mg total) by mouth 2 (two) times a day with meals for 7 days, Disp: 14 tablet, Rfl: 0  •  triamcinolone (KENALOG) 0.1 % cream, Apply topically 2 (two) times a day (Patient not taking: Reported on 7/20/2023), Disp: 30 g, Rfl: 0    ALLERGIES:  No Known Allergies    Review of systems:   Constitutional: Negative for fatigue, fever or loss of apetite. HENT: Negative. Respiratory: Negative for shortness of breath, dyspnea. Cardiovascular: Negative for chest pain/tightness. Gastrointestinal: Negative for abdominal pain, N/V. Endocrine: Negative for cold/heat intolerance, unexplained weight loss/gain. Genitourinary: Negative for flank pain, dysuria, hematuria. Musculoskeletal: Positive as in the HPI  Skin: Negative for rash. Neurological: Negative  Psychiatric/Behavioral: Negative for agitation. _____________________________________________________  PHYSICAL EXAMINATION:    Blood pressure 140/82, pulse 90, temperature 97.6 °F (36.4 °C), temperature source Temporal, height 5' 4" (1.626 m), weight 97.1 kg (214 lb), last menstrual period 09/14/2020. General: well developed and well nourished, alert, oriented times 3 and appears comfortable  Psychiatric: Normal  HEENT: Benign  Cardiovascular: Regular    Pulmonary: No wheezing or stridor  Abdomen: Soft, Nontender  Skin: No masses, erythema, lacerations, fluctation, ulcerations  Neurovascular: There are and sensory exams are intact and pulses palpable. MUSCULOSKELETAL EXAMINATION:    The right knee exam demonstrates the skin to be warm and dry. There appears to be no significant effusion and she had full extension and flexion to 130 degrees complaining of anterior knee pain at endrange flexion. Medial and lateral joint lines were nontender.   She had some mild tenderness over the medial tibial plateau and was nontender over the femoral condyle. The anterior and lateral knee were nontender. Collateral and cruciate ligaments are grossly stable. Meniscal signs are absent. There was no crepitus noted. The remainder of the lower extremity examination bilaterally is benign. _____________________________________________________  STUDIES REVIEWED:  X-rays of the knee were obtained at the [facility] family practice office but were not available for my review. The report was available in the chart indicating tricompartmental arthritis with the greatest involvement at the medial compartment.       Katelynn Armijo

## 2023-10-09 ENCOUNTER — HOSPITAL ENCOUNTER (OUTPATIENT)
Dept: CT IMAGING | Facility: HOSPITAL | Age: 58
Discharge: HOME | End: 2023-10-09
Payer: COMMERCIAL

## 2023-10-09 ENCOUNTER — HOSPITAL ENCOUNTER (OUTPATIENT)
Dept: RADIOLOGY | Facility: CLINIC | Age: 58
Discharge: HOME | End: 2023-10-09
Payer: COMMERCIAL

## 2023-10-09 VITALS — BODY MASS INDEX: 35.51 KG/M2 | HEIGHT: 64 IN | WEIGHT: 208 LBS

## 2023-10-09 DIAGNOSIS — Z12.31 ENCOUNTER FOR SCREENING MAMMOGRAM FOR MALIGNANT NEOPLASM OF BREAST: ICD-10-CM

## 2023-10-09 DIAGNOSIS — R10.9 LEFT FLANK PAIN: ICD-10-CM

## 2023-10-09 PROCEDURE — 74176 CT ABD & PELVIS W/O CONTRAST: CPT

## 2023-10-09 PROCEDURE — 77063 BREAST TOMOSYNTHESIS BI: CPT

## 2023-10-09 PROCEDURE — 77067 SCR MAMMO BI INCL CAD: CPT

## 2023-10-13 RX ORDER — TAMSULOSIN HYDROCHLORIDE 0.4 MG/1
0.4 CAPSULE ORAL DAILY
COMMUNITY
Start: 2023-10-10

## 2023-10-16 ENCOUNTER — OFFICE VISIT (OUTPATIENT)
Dept: UROLOGY | Facility: CLINIC | Age: 58
End: 2023-10-16
Payer: COMMERCIAL

## 2023-10-16 VITALS
DIASTOLIC BLOOD PRESSURE: 88 MMHG | TEMPERATURE: 98.8 F | WEIGHT: 216.6 LBS | OXYGEN SATURATION: 97 % | BODY MASS INDEX: 37.18 KG/M2 | SYSTOLIC BLOOD PRESSURE: 132 MMHG | HEART RATE: 74 BPM

## 2023-10-16 DIAGNOSIS — N20.0 CALCULUS OF KIDNEY: Primary | ICD-10-CM

## 2023-10-16 LAB
SL AMB  POCT GLUCOSE, UA: ABNORMAL
SL AMB LEUKOCYTE ESTERASE,UA: ABNORMAL
SL AMB POCT BILIRUBIN,UA: ABNORMAL
SL AMB POCT BLOOD,UA: ABNORMAL
SL AMB POCT CLARITY,UA: CLEAR
SL AMB POCT COLOR,UA: YELLOW
SL AMB POCT KETONES,UA: ABNORMAL
SL AMB POCT NITRITE,UA: ABNORMAL
SL AMB POCT PH,UA: 5.5
SL AMB POCT SPECIFIC GRAVITY,UA: 1.02
SL AMB POCT URINE PROTEIN: ABNORMAL
SL AMB POCT UROBILINOGEN: 0.2

## 2023-10-16 PROCEDURE — 81003 URINALYSIS AUTO W/O SCOPE: CPT | Performed by: UROLOGY

## 2023-10-16 PROCEDURE — 99203 OFFICE O/P NEW LOW 30 MIN: CPT | Performed by: UROLOGY

## 2023-10-16 NOTE — PROGRESS NOTES
UROLOGY PROGRESS NOTE         NAME: Stephanie Lovell  AGE: 60 y.o. SEX: female  : 1965   MRN: [id number]    DATE: 10/16/2023  TIME: 4:15 PM    Assessment and Plan      Impression:   1. Calculus of kidney  -     POCT urine dip auto non-scope      Large 1-1/2 cm left proximal stone with hydronephrosis. Importance of treatment discussed. Options discussed.  Plan: Plan on cystoscopy left ureteroscopy laser lithotripsy stone extraction stent placement. Patient understands it may take more than 1 procedure. General anesthetic      Chief Complaint     Chief Complaint   Patient presents with    New Patient Visit     Pain in the abdomen comes and goes     History of Present Illness     HPI: Stephanie Lovell is a 60y.o. year old female who presents with week and a half ago with left-sided flank pain and fatigue. CAT scan revealed a 1-1/2 cm proximal left stone with hydronephrosis. Smaller stone behind it. No other stones. She has had no previous  history. She voids every few hours during the day. 0-1 time per night. She had 1 UTI many years ago. No gross hematuria no other significant medical problems. She does have some fatigue and some epigastric discomfort right now. The following portions of the patient's history were reviewed and updated as appropriate: allergies, current medications, past family history, past medical history, past social history, past surgical history and problem list.  Past Medical History:   Diagnosis Date    Diabetes mellitus ([address])      Past Surgical History:   Procedure Laterality Date     SECTION      WISDOM TOOTH EXTRACTION       shoulder  Review of Systems     Const: Denies chills, fever and weight loss. CV: Denies chest pain. Resp: Denies SOB. GI: Denies abdominal pain, nausea and vomiting. : Denies symptoms other than stated above. Musculo: Denies back pain.     Objective   /88 (BP Location: Left arm, Patient Position: Sitting)   Pulse 74   Temp 98.8 °F (37.1 °C)   Wt 98.2 kg (216 lb 9.6 oz)   LMP 09/14/2020 (Within Days)   SpO2 97%   BMI 37.18 kg/m²     Physical Exam  Const: Appears healthy and well developed. No signs of acute distress present. Resp: Respirations are regular and unlabored. CV: Rate is regular. Rhythm is regular. Abdomen: Abdomen is soft, nontender, and nondistended. Kidneys are not palpable. : No CVA tenderness no suprapubic tenderness  Psych: Patient's attitude is cooperative.  Mood is normal. Affect is normal.    Current Medications     Current Outpatient Medications:     Biotin 1 MG CAPS, Take 1 mg by mouth, Disp: , Rfl:     Cranberry 475 MG CAPS, Take by mouth, Disp: , Rfl:     glimepiride (AMARYL) 2 mg tablet, Take 0.5 mg by mouth daily , Disp: , Rfl:     ibuprofen (MOTRIN) 200 mg tablet, Take by mouth every 6 (six) hours as needed for mild pain, Disp: , Rfl:     metFORMIN (GLUCOPHAGE-XR) 750 mg 24 hr tablet, Take 500 mg by mouth in the morning, Disp: , Rfl:     Multiple Vitamins-Minerals (Multivitamin Adult) TABS, Take by mouth, Disp: , Rfl:     tamsulosin (FLOMAX) 0.4 mg, Take 0.4 mg by mouth daily, Disp: , Rfl:     furosemide (LASIX) 20 mg tablet, Take 20 mg by mouth daily as needed (Patient not taking: Reported on 9/16/2021), Disp: , Rfl:     metFORMIN (GLUCOPHAGE-XR) 750 mg 24 hr tablet, Take 750 mg by mouth (Patient not taking: Reported on 10/16/2023), Disp: , Rfl:         Chelsey Polanco MD

## 2023-10-16 NOTE — H&P (VIEW-ONLY)
UROLOGY PROGRESS NOTE         NAME: Ileana Kearns  AGE: 60 y.o. SEX: female  : 1965   MRN: [id number]    DATE: 10/16/2023  TIME: 4:15 PM    Assessment and Plan      Impression:   1. Calculus of kidney  -     POCT urine dip auto non-scope      Large 1-1/2 cm left proximal stone with hydronephrosis. Importance of treatment discussed. Options discussed.  Plan: Plan on cystoscopy left ureteroscopy laser lithotripsy stone extraction stent placement. Patient understands it may take more than 1 procedure. General anesthetic      Chief Complaint     Chief Complaint   Patient presents with    New Patient Visit     Pain in the abdomen comes and goes     History of Present Illness     HPI: Ileana Kearns is a 60y.o. year old female who presents with week and a half ago with left-sided flank pain and fatigue. CAT scan revealed a 1-1/2 cm proximal left stone with hydronephrosis. Smaller stone behind it. No other stones. She has had no previous  history. She voids every few hours during the day. 0-1 time per night. She had 1 UTI many years ago. No gross hematuria no other significant medical problems. She does have some fatigue and some epigastric discomfort right now. The following portions of the patient's history were reviewed and updated as appropriate: allergies, current medications, past family history, past medical history, past social history, past surgical history and problem list.  Past Medical History:   Diagnosis Date    Diabetes mellitus ([address])      Past Surgical History:   Procedure Laterality Date     SECTION      WISDOM TOOTH EXTRACTION       shoulder  Review of Systems     Const: Denies chills, fever and weight loss. CV: Denies chest pain. Resp: Denies SOB. GI: Denies abdominal pain, nausea and vomiting. : Denies symptoms other than stated above. Musculo: Denies back pain.     Objective   /88 (BP Location: Left arm, Patient Position: Sitting)   Pulse 74   Temp 98.8 °F (37.1 °C)   Wt 98.2 kg (216 lb 9.6 oz)   LMP 09/14/2020 (Within Days)   SpO2 97%   BMI 37.18 kg/m²     Physical Exam  Const: Appears healthy and well developed. No signs of acute distress present. Resp: Respirations are regular and unlabored. CV: Rate is regular. Rhythm is regular. Abdomen: Abdomen is soft, nontender, and nondistended. Kidneys are not palpable. : No CVA tenderness no suprapubic tenderness  Psych: Patient's attitude is cooperative.  Mood is normal. Affect is normal.    Current Medications     Current Outpatient Medications:     Biotin 1 MG CAPS, Take 1 mg by mouth, Disp: , Rfl:     Cranberry 475 MG CAPS, Take by mouth, Disp: , Rfl:     glimepiride (AMARYL) 2 mg tablet, Take 0.5 mg by mouth daily , Disp: , Rfl:     ibuprofen (MOTRIN) 200 mg tablet, Take by mouth every 6 (six) hours as needed for mild pain, Disp: , Rfl:     metFORMIN (GLUCOPHAGE-XR) 750 mg 24 hr tablet, Take 500 mg by mouth in the morning, Disp: , Rfl:     Multiple Vitamins-Minerals (Multivitamin Adult) TABS, Take by mouth, Disp: , Rfl:     tamsulosin (FLOMAX) 0.4 mg, Take 0.4 mg by mouth daily, Disp: , Rfl:     furosemide (LASIX) 20 mg tablet, Take 20 mg by mouth daily as needed (Patient not taking: Reported on 9/16/2021), Disp: , Rfl:     metFORMIN (GLUCOPHAGE-XR) 750 mg 24 hr tablet, Take 750 mg by mouth (Patient not taking: Reported on 10/16/2023), Disp: , Rfl:         Michelle Moya MD

## 2023-10-16 NOTE — Clinical Note
Please get her on as soon as possible for a left ureteroscopy laser lithotripsy stone extraction with stent placement. Please get the high-powered laser.   Please schedule her for 1-1/2 hours in total.

## 2023-10-17 ENCOUNTER — PREP FOR PROCEDURE (OUTPATIENT)
Dept: UROLOGY | Facility: CLINIC | Age: 58
End: 2023-10-17

## 2023-10-17 DIAGNOSIS — R39.89 SUSPECTED UTI: Primary | ICD-10-CM

## 2023-10-17 DIAGNOSIS — E11.9 TYPE 2 DIABETES MELLITUS WITHOUT COMPLICATION, UNSPECIFIED WHETHER LONG TERM INSULIN USE (HCC): ICD-10-CM

## 2023-10-17 DIAGNOSIS — Z01.818 ENCOUNTER FOR PREADMISSION TESTING: ICD-10-CM

## 2023-10-18 ENCOUNTER — APPOINTMENT (OUTPATIENT)
Dept: LAB | Facility: HOSPITAL | Age: 58
End: 2023-10-18
Payer: COMMERCIAL

## 2023-10-18 DIAGNOSIS — E11.9 TYPE 2 DIABETES MELLITUS WITHOUT COMPLICATION, UNSPECIFIED WHETHER LONG TERM INSULIN USE (HCC): ICD-10-CM

## 2023-10-18 DIAGNOSIS — Z01.818 ENCOUNTER FOR PREADMISSION TESTING: ICD-10-CM

## 2023-10-18 LAB
ANION GAP SERPL CALCULATED.3IONS-SCNC: 6 MMOL/L
BASOPHILS # BLD AUTO: 0.04 THOUSANDS/ÂΜL (ref 0–0.1)
BASOPHILS NFR BLD AUTO: 1 % (ref 0–1)
BUN SERPL-MCNC: 23 MG/DL (ref 5–25)
CALCIUM SERPL-MCNC: 9.1 MG/DL (ref 8.4–10.2)
CHLORIDE SERPL-SCNC: 100 MMOL/L (ref 96–108)
CO2 SERPL-SCNC: 31 MMOL/L (ref 21–32)
CREAT SERPL-MCNC: 1.12 MG/DL (ref 0.6–1.3)
EOSINOPHIL # BLD AUTO: 0.21 THOUSAND/ÂΜL (ref 0–0.61)
EOSINOPHIL NFR BLD AUTO: 3 % (ref 0–6)
ERYTHROCYTE [DISTWIDTH] IN BLOOD BY AUTOMATED COUNT: 10.6 % (ref 11.6–15.1)
EST. AVERAGE GLUCOSE BLD GHB EST-MCNC: 146 MG/DL
GFR SERPL CREATININE-BSD FRML MDRD: 53 ML/MIN/1.73SQ M
GLUCOSE P FAST SERPL-MCNC: 211 MG/DL (ref 65–99)
HBA1C MFR BLD: 6.7 %
HCT VFR BLD AUTO: 39.3 % (ref 34.8–46.1)
HGB BLD-MCNC: 13.1 G/DL (ref 11.5–15.4)
IMM GRANULOCYTES # BLD AUTO: 0.05 THOUSAND/UL (ref 0–0.2)
IMM GRANULOCYTES NFR BLD AUTO: 1 % (ref 0–2)
LYMPHOCYTES # BLD AUTO: 1.22 THOUSANDS/ÂΜL (ref 0.6–4.47)
LYMPHOCYTES NFR BLD AUTO: 16 % (ref 14–44)
MCH RBC QN AUTO: 31.2 PG (ref 26.8–34.3)
MCHC RBC AUTO-ENTMCNC: 33.3 G/DL (ref 31.4–37.4)
MCV RBC AUTO: 94 FL (ref 82–98)
MONOCYTES # BLD AUTO: 0.58 THOUSAND/ÂΜL (ref 0.17–1.22)
MONOCYTES NFR BLD AUTO: 8 % (ref 4–12)
NEUTROPHILS # BLD AUTO: 5.39 THOUSANDS/ÂΜL (ref 1.85–7.62)
NEUTS SEG NFR BLD AUTO: 71 % (ref 43–75)
NRBC BLD AUTO-RTO: 0 /100 WBCS
PLATELET # BLD AUTO: 311 THOUSANDS/UL (ref 149–390)
PMV BLD AUTO: 8.8 FL (ref 8.9–12.7)
POTASSIUM SERPL-SCNC: 4.3 MMOL/L (ref 3.5–5.3)
RBC # BLD AUTO: 4.2 MILLION/UL (ref 3.81–5.12)
SODIUM SERPL-SCNC: 137 MMOL/L (ref 135–147)
WBC # BLD AUTO: 7.49 THOUSAND/UL (ref 4.31–10.16)

## 2023-10-18 PROCEDURE — 85025 COMPLETE CBC W/AUTO DIFF WBC: CPT

## 2023-10-18 PROCEDURE — 87086 URINE CULTURE/COLONY COUNT: CPT

## 2023-10-18 PROCEDURE — 80048 BASIC METABOLIC PNL TOTAL CA: CPT

## 2023-10-18 PROCEDURE — 36415 COLL VENOUS BLD VENIPUNCTURE: CPT

## 2023-10-18 PROCEDURE — 83036 HEMOGLOBIN GLYCOSYLATED A1C: CPT

## 2023-10-19 LAB — BACTERIA UR CULT: NORMAL

## 2023-10-23 ENCOUNTER — TELEPHONE (OUTPATIENT)
Dept: UROLOGY | Facility: CLINIC | Age: 58
End: 2023-10-23

## 2023-10-23 NOTE — TELEPHONE ENCOUNTER
----- Message from Aga Hatfield MD sent at 10/21/2023  4:44 PM EDT -----  Please copy her PCP.   Thank you  ----- Message -----  From: Lab, Background User  Sent: 10/18/2023   9:08 AM EDT  To: Aga Hatfield MD

## 2023-10-24 ENCOUNTER — ANESTHESIA EVENT (OUTPATIENT)
Dept: PERIOP | Facility: HOSPITAL | Age: 58
End: 2023-10-24
Payer: COMMERCIAL

## 2023-10-24 PROBLEM — E11.9 DIABETES MELLITUS, TYPE 2 (HCC): Status: ACTIVE | Noted: 2023-10-24

## 2023-10-24 NOTE — ANESTHESIA PREPROCEDURE EVALUATION
Procedure:  CYSTOSCOPY URETEROSCOPY WITH LITHOTRIPSY HOLMIUM LASER, RETROGRADE PYELOGRAM AND INSERTION STENT URETERAL (Left: Ureter)    Relevant Problems   ENDO   (+) Diabetes mellitus, type 2 (HCC)      MUSCULOSKELETAL   (+) Primary osteoarthritis of right knee   60y.o. year old female who presents with week and a half ago with left-sided flank pain and fatigue. CAT scan revealed a 1-1/2 cm proximal left stone with hydronephrosis. Physical Exam    Airway    Mallampati score: II  TM Distance: >3 FB  Neck ROM: full     Dental   No notable dental hx     Cardiovascular      Pulmonary  Pulmonary exam normal     Other Findings        Anesthesia Plan  ASA Score- 2     Anesthesia Type- general with ASA Monitors. Additional Monitors:     Airway Plan: LMA. Plan Factors-Exercise tolerance (METS): >4 METS. Chart reviewed. Existing labs reviewed. Patient summary reviewed. Induction- intravenous. Postoperative Plan-     Informed Consent- Anesthetic plan and risks discussed with patient. I personally reviewed this patient with the CRNA. Discussed and agreed on the Anesthesia Plan with the CRNA. Elvin Burroughs

## 2023-10-24 NOTE — PRE-PROCEDURE INSTRUCTIONS
Pre-Surgery Instructions:   Medication Instructions    Biotin 1 MG CAPS Stop taking 7 days prior to surgery. Cranberry 475 MG CAPS Stop taking 7 days prior to surgery. glimepiride (AMARYL) 2 mg tablet Hold day of surgery. ibuprofen (MOTRIN) 200 mg tablet Stop taking 7 days prior to surgery. metFORMIN (GLUCOPHAGE-XR) 750 mg 24 hr tablet Hold day of surgery. Multiple Vitamins-Minerals (Multivitamin Adult) TABS Stop taking 7 days prior to surgery. Medication instructions for day surgery reviewed. Please use only a sip of water to take your instructed medications. Avoid all over the counter vitamins, supplements and NSAIDS for one week prior to surgery per anesthesia guidelines. Tylenol is ok to take as needed. Pt states she already stopped vits/supplements and NSAIDS 7 days prior. You will receive a call one business day prior to surgery with an arrival time and hospital directions. If your surgery is scheduled on a Monday, the hospital will be calling you on the Friday prior to your surgery. If you have not heard from anyone by 8pm, please call the hospital supervisor through the hospital  at [phone]. Shanelle Gleason [phone]). Do not eat or drink anything after midnight the night before your surgery, including candy, mints, lifesavers, or chewing gum. Do not drink alcohol 24hrs before your surgery. Try not to smoke at least 24hrs before your surgery. Follow the pre surgery showering instructions as listed in the [facility] Surgical Experience Booklet” or otherwise provided by your surgeon's office. Do not shave the surgical area 24 hours before surgery. Do not apply any lotions, creams, including makeup, cologne, deodorant, or perfumes after showering on the day of your surgery. No contact lenses, eye make-up, or artificial eyelashes. Remove nail polish, including gel polish, and any artificial, gel, or acrylic nails if possible.  Remove all jewelry including rings and body piercing jewelry. Wear causal clothing that is easy to take on and off. Consider your type of surgery. Keep any valuables, jewelry, piercings at home. Please bring any specially ordered equipment (sling, braces) if indicated. Arrange for a responsible person to drive you to and from the hospital on the day of your surgery. Visitor Guidelines discussed. Call the surgeon's office with any new illnesses, exposures, or additional questions prior to surgery. Please reference your [facility] Surgical Experience Booklet” for additional information to prepare for your upcoming surgery.

## 2023-10-25 ENCOUNTER — TELEPHONE (OUTPATIENT)
Dept: UROLOGY | Facility: CLINIC | Age: 58
End: 2023-10-25

## 2023-10-25 ENCOUNTER — ANESTHESIA (OUTPATIENT)
Dept: PERIOP | Facility: HOSPITAL | Age: 58
End: 2023-10-25
Payer: COMMERCIAL

## 2023-10-25 ENCOUNTER — HOSPITAL ENCOUNTER (OUTPATIENT)
Facility: HOSPITAL | Age: 58
Setting detail: OUTPATIENT SURGERY
Discharge: HOME | End: 2023-10-25
Attending: UROLOGY | Admitting: UROLOGY
Payer: COMMERCIAL

## 2023-10-25 ENCOUNTER — APPOINTMENT (OUTPATIENT)
Dept: RADIOLOGY | Facility: HOSPITAL | Age: 58
End: 2023-10-25
Payer: COMMERCIAL

## 2023-10-25 VITALS
BODY MASS INDEX: 36.7 KG/M2 | WEIGHT: 215 LBS | SYSTOLIC BLOOD PRESSURE: 120 MMHG | HEIGHT: 64 IN | HEART RATE: 72 BPM | OXYGEN SATURATION: 96 % | TEMPERATURE: 97.6 F | RESPIRATION RATE: 18 BRPM | DIASTOLIC BLOOD PRESSURE: 67 MMHG

## 2023-10-25 DIAGNOSIS — N20.1 URETERAL CALCULUS: Primary | ICD-10-CM

## 2023-10-25 LAB
GLUCOSE SERPL-MCNC: 107 MG/DL (ref 65–140)
GLUCOSE SERPL-MCNC: 122 MG/DL (ref 65–140)

## 2023-10-25 PROCEDURE — 74018 RADEX ABDOMEN 1 VIEW: CPT

## 2023-10-25 PROCEDURE — C1769 GUIDE WIRE: HCPCS | Performed by: UROLOGY

## 2023-10-25 PROCEDURE — C2617 STENT, NON-COR, TEM W/O DEL: HCPCS | Performed by: UROLOGY

## 2023-10-25 PROCEDURE — C1894 INTRO/SHEATH, NON-LASER: HCPCS | Performed by: UROLOGY

## 2023-10-25 PROCEDURE — C1747 URETEROSCOPE DIGITAL FLEX SNGL USE STD DEFLECTION APTRA: HCPCS | Performed by: UROLOGY

## 2023-10-25 PROCEDURE — 82948 REAGENT STRIP/BLOOD GLUCOSE: CPT

## 2023-10-25 PROCEDURE — 52356 CYSTO/URETERO W/LITHOTRIPSY: CPT | Performed by: UROLOGY

## 2023-10-25 DEVICE — INLAY OPTIMA URETERAL STENT W/O GUIDEWIRE
Type: IMPLANTABLE DEVICE | Site: URETER | Status: FUNCTIONAL
Brand: BARD® INLAY OPTIMA® URETERAL STENT

## 2023-10-25 RX ORDER — EPHEDRINE SULFATE 50 MG/ML
INJECTION INTRAVENOUS AS NEEDED
Status: DISCONTINUED | OUTPATIENT
Start: 2023-10-25 | End: 2023-10-25

## 2023-10-25 RX ORDER — CEFUROXIME AXETIL 500 MG/1
500 TABLET ORAL EVERY 12 HOURS SCHEDULED
Qty: 14 TABLET | Refills: 0 | Status: SHIPPED | OUTPATIENT
Start: 2023-10-25 | End: 2023-11-01

## 2023-10-25 RX ORDER — MIDAZOLAM HYDROCHLORIDE 2 MG/2ML
INJECTION, SOLUTION INTRAMUSCULAR; INTRAVENOUS AS NEEDED
Status: DISCONTINUED | OUTPATIENT
Start: 2023-10-25 | End: 2023-10-25

## 2023-10-25 RX ORDER — CEFAZOLIN SODIUM 2 G/50ML
2000 SOLUTION INTRAVENOUS ONCE
Status: COMPLETED | OUTPATIENT
Start: 2023-10-25 | End: 2023-10-25

## 2023-10-25 RX ORDER — OXYCODONE HYDROCHLORIDE AND ACETAMINOPHEN 5; 325 MG/1; MG/1
1-2 TABLET ORAL EVERY 6 HOURS PRN
Qty: 20 TABLET | Refills: 0 | Status: SHIPPED | OUTPATIENT
Start: 2023-10-25

## 2023-10-25 RX ORDER — ALBUTEROL SULFATE 2.5 MG/3ML
2.5 SOLUTION RESPIRATORY (INHALATION) ONCE AS NEEDED
Status: DISCONTINUED | OUTPATIENT
Start: 2023-10-25 | End: 2023-10-25 | Stop reason: HOSPADM

## 2023-10-25 RX ORDER — MAGNESIUM HYDROXIDE 1200 MG/15ML
LIQUID ORAL AS NEEDED
Status: DISCONTINUED | OUTPATIENT
Start: 2023-10-25 | End: 2023-10-25 | Stop reason: HOSPADM

## 2023-10-25 RX ORDER — KETOROLAC TROMETHAMINE 30 MG/ML
INJECTION, SOLUTION INTRAMUSCULAR; INTRAVENOUS AS NEEDED
Status: DISCONTINUED | OUTPATIENT
Start: 2023-10-25 | End: 2023-10-25

## 2023-10-25 RX ORDER — ONDANSETRON 2 MG/ML
4 INJECTION INTRAMUSCULAR; INTRAVENOUS ONCE AS NEEDED
Status: DISCONTINUED | OUTPATIENT
Start: 2023-10-25 | End: 2023-10-25 | Stop reason: HOSPADM

## 2023-10-25 RX ORDER — HYDROMORPHONE HCL/PF 1 MG/ML
0.5 SYRINGE (ML) INJECTION
Status: DISCONTINUED | OUTPATIENT
Start: 2023-10-25 | End: 2023-10-25 | Stop reason: HOSPADM

## 2023-10-25 RX ORDER — SODIUM CHLORIDE, SODIUM LACTATE, POTASSIUM CHLORIDE, CALCIUM CHLORIDE 600; 310; 30; 20 MG/100ML; MG/100ML; MG/100ML; MG/100ML
INJECTION, SOLUTION INTRAVENOUS CONTINUOUS PRN
Status: DISCONTINUED | OUTPATIENT
Start: 2023-10-25 | End: 2023-10-25

## 2023-10-25 RX ORDER — FENTANYL CITRATE 50 UG/ML
INJECTION, SOLUTION INTRAMUSCULAR; INTRAVENOUS AS NEEDED
Status: DISCONTINUED | OUTPATIENT
Start: 2023-10-25 | End: 2023-10-25

## 2023-10-25 RX ORDER — FENTANYL CITRATE/PF 50 MCG/ML
25 SYRINGE (ML) INJECTION
Status: DISCONTINUED | OUTPATIENT
Start: 2023-10-25 | End: 2023-10-25 | Stop reason: HOSPADM

## 2023-10-25 RX ORDER — PHENAZOPYRIDINE HYDROCHLORIDE 100 MG/1
200 TABLET, FILM COATED ORAL
Status: DISCONTINUED | OUTPATIENT
Start: 2023-10-25 | End: 2023-10-25 | Stop reason: HOSPADM

## 2023-10-25 RX ORDER — ONDANSETRON 2 MG/ML
INJECTION INTRAMUSCULAR; INTRAVENOUS AS NEEDED
Status: DISCONTINUED | OUTPATIENT
Start: 2023-10-25 | End: 2023-10-25

## 2023-10-25 RX ORDER — SUCCINYLCHOLINE/SOD CL,ISO/PF 100 MG/5ML
SYRINGE (ML) INTRAVENOUS AS NEEDED
Status: DISCONTINUED | OUTPATIENT
Start: 2023-10-25 | End: 2023-10-25

## 2023-10-25 RX ORDER — LIDOCAINE HYDROCHLORIDE 10 MG/ML
INJECTION, SOLUTION EPIDURAL; INFILTRATION; INTRACAUDAL; PERINEURAL AS NEEDED
Status: DISCONTINUED | OUTPATIENT
Start: 2023-10-25 | End: 2023-10-25

## 2023-10-25 RX ORDER — METOCLOPRAMIDE HYDROCHLORIDE 5 MG/ML
5 INJECTION INTRAMUSCULAR; INTRAVENOUS ONCE AS NEEDED
Status: DISCONTINUED | OUTPATIENT
Start: 2023-10-25 | End: 2023-10-25 | Stop reason: HOSPADM

## 2023-10-25 RX ORDER — PROPOFOL 10 MG/ML
INJECTION, EMULSION INTRAVENOUS AS NEEDED
Status: DISCONTINUED | OUTPATIENT
Start: 2023-10-25 | End: 2023-10-25

## 2023-10-25 RX ADMIN — PROPOFOL 200 MG: 10 INJECTION, EMULSION INTRAVENOUS at 13:09

## 2023-10-25 RX ADMIN — Medication 40 MG: at 14:06

## 2023-10-25 RX ADMIN — MIDAZOLAM 2 MG: 1 INJECTION INTRAMUSCULAR; INTRAVENOUS at 13:04

## 2023-10-25 RX ADMIN — PHENYLEPHRINE HYDROCHLORIDE 200 MCG: 10 INJECTION INTRAVENOUS at 13:26

## 2023-10-25 RX ADMIN — LIDOCAINE HYDROCHLORIDE 50 MG: 10 INJECTION, SOLUTION EPIDURAL; INFILTRATION; INTRACAUDAL at 13:09

## 2023-10-25 RX ADMIN — PHENAZOPYRIDINE 200 MG: 100 TABLET ORAL at 14:56

## 2023-10-25 RX ADMIN — CEFAZOLIN SODIUM 2000 MG: 2 SOLUTION INTRAVENOUS at 13:13

## 2023-10-25 RX ADMIN — SODIUM CHLORIDE 8 MCG: 9 INJECTION, SOLUTION INTRAVENOUS at 14:08

## 2023-10-25 RX ADMIN — EPHEDRINE SULFATE 10 MG: 50 INJECTION, SOLUTION INTRAVENOUS at 13:34

## 2023-10-25 RX ADMIN — KETOROLAC TROMETHAMINE 15 MG: 30 INJECTION, SOLUTION INTRAMUSCULAR; INTRAVENOUS at 14:03

## 2023-10-25 RX ADMIN — SODIUM CHLORIDE, SODIUM LACTATE, POTASSIUM CHLORIDE, AND CALCIUM CHLORIDE: .6; .31; .03; .02 INJECTION, SOLUTION INTRAVENOUS at 13:00

## 2023-10-25 RX ADMIN — FENTANYL CITRATE 100 MCG: 50 INJECTION INTRAMUSCULAR; INTRAVENOUS at 13:09

## 2023-10-25 RX ADMIN — SODIUM CHLORIDE, SODIUM LACTATE, POTASSIUM CHLORIDE, AND CALCIUM CHLORIDE: .6; .31; .03; .02 INJECTION, SOLUTION INTRAVENOUS at 12:59

## 2023-10-25 RX ADMIN — ONDANSETRON 4 MG: 2 INJECTION INTRAMUSCULAR; INTRAVENOUS at 13:09

## 2023-10-25 NOTE — ANESTHESIA POSTPROCEDURE EVALUATION
Post-Op Assessment Note    CV Status:  Stable  Pain Score: 0    Pain management: adequate     Mental Status:  Alert and awake   Hydration Status:  Euvolemic   PONV Controlled:  Controlled   Airway Patency:  Patent      Post Op Vitals Reviewed: Yes      Staff: CRNA         No notable events documented.     BP   109/73   Temp 97.4   Pulse 82   Resp 20   SpO2 100% 4 L mask

## 2023-10-25 NOTE — TELEPHONE ENCOUNTER
----- Message from Leida England MD sent at 10/25/2023  2:13 PM EDT -----  Please have her back to see me with a CAT scan when I return. Cystoscopy possible stent removal.  CT ordered.   2 weeks from now

## 2023-10-25 NOTE — INTERVAL H&P NOTE
H&P reviewed. After examining the patient I find no changes in the patients condition since the H&P had been written.     Vitals:    10/25/23 1123   BP: 139/81   Pulse: 78   Resp: 18   Temp: 97.6 °F (36.4 °C)   SpO2: 96%

## 2023-10-25 NOTE — TELEPHONE ENCOUNTER
Left message for patient to call central scheduling to get CT scheduled then to call office to schedule follow up appointment

## 2023-10-25 NOTE — ANESTHESIA POSTPROCEDURE EVALUATION
Post-Op Assessment Note    Encounter Notable Events   Notable Event Outcome Phase Comment   Laryngospasm  Intraprocedure Laryngospasm with emergence, coughed LMA out then laryngospasm       /69 (10/25/23 1453)    Temp 97.6 °F (36.4 °C) (10/25/23 1453)    Pulse 72 (10/25/23 1453)   Resp (!) 10 (10/25/23 1453)    SpO2 95 % (10/25/23 1453)        Patient doing well in recovery, denies any complaints, VSS.

## 2023-10-25 NOTE — OP NOTE
OPERATIVE REPORT  PATIENT NAME: Lynn Stinson    :  1965  MRN: [id number]  Pt Location: [facility] OR ROOM 02    SURGERY DATE: 10/25/2023    Surgeon(s) and Role:     Cullen Rodgers MD - Primary    Preop Diagnosis:  Calculus of kidney [N20.0]    Post-Op Diagnosis Codes:     * Calculus of kidney [N20.0]    Procedure(s):  Left - CYSTOSCOPY URETEROSCOPY WITH LITHOTRIPSY HOLMIUM LASER. RETROGRADE PYELOGRAM AND INSERTION STENT URETERAL    Specimen(s):  * No specimens in log *    Estimated Blood Loss:   Minimal    Drains:  Ureteral Internal Stent Left ureter 6 Fr. (Active)   Number of days: 0       Anesthesia Type:   General/LMA    Operative Indications:  Calculus of kidney [N20.0]  Large impacted ureteral calculus. Status post ureteroscopy laser lithotripsy powder fragmentation multiple small fragments remaining. Double-J stent placed. Will need to be reassessed with a CAT scan    Operative Findings:  Large impacted mid ureteral proximal stone. Well fragmented. Multiple fragments remained. Stent placed. Complications:   None    Procedure and Technique:  Patient brought to the operating room she was given a general anesthetic she had SCDs in place. Hard timeout. Dorsolithotomy position. Prepped and draped in sterile fashion. Antibiotics on board. 25 [place] cystoscopy revealed a normal urethra and bladder. No tumors no diverticuli no stones. Guidewire would not go up to the level the kidney was stuck at the stone. An open-ended catheter was used to get the nitinol wire past the stone effectively. Reentry sheath was placed up to the level of the stone. . Disposable ureteroscope was then used to visualize a stone in the proximal ureter. A laser was used to fragment the stone with a 272 µm laser fiber. A powder setting was used. The stone was impacted in the ureter. A lot of inflammation. The stone was 1.7 cm. It was slowly fragmented and powdered. There were multiple fragments.   Not sure if some of the fragments moved up into the kidney. Multiple stones were washed out. At the end of the procedure there remained a lot of inflammation at the level of the stone. The proximal ureter was hydronephrotic. Subsequently a guidewire safety wire was used to place a 6 [place] by 24 cm double-J stent with a coil in the kidney and bladder under fluoroscopic guidance and direct vision. Patient Toller procedure well. We will see the patient back in 2 weeks with a CAT scan to assess for remaining stone burden and remove the stent. She was sent home on antibiotics and pain medication with instructions. I was present for the entire procedure.     Patient Disposition:  PACU         SIGNATURE: Dai Sahni MD  DATE: October 25, 2023  TIME: 2:18 PM

## 2023-10-26 NOTE — TELEPHONE ENCOUNTER
Per Dr. Guy Steel patient is to get CT scan first then to follow up with him    Patient is scheduled to follow up 11/15/23

## 2023-10-27 ENCOUNTER — TELEPHONE (OUTPATIENT)
Dept: UROLOGY | Facility: CLINIC | Age: 58
End: 2023-10-27

## 2023-10-27 NOTE — TELEPHONE ENCOUNTER
Patient called and stated that her work is asking for a work note stating how long she will be out of work    Patient can be reached at [phone]

## 2023-10-27 NOTE — TELEPHONE ENCOUNTER
Note done and printed for patient. She stated that she will be down today before the office closes to pick it up.

## 2023-10-27 NOTE — TELEPHONE ENCOUNTER
Patient called back and said that she took Wednesday and Thursday off as vacation days after her procedure on 10/25/23 . She said that she woke up today and was still not feeling the best to go to work. She said that she felt sore all over and just needed another day. Can a note please be given for today stating that she has been off today due to having procedure on 10/25/23.

## 2023-10-27 NOTE — TELEPHONE ENCOUNTER
Patient called stating she was speaking with the nurse at the office and the call got disconnected.  Warm transfer to nurse at office

## 2023-10-27 NOTE — LETTER
October 27, 2023     Patient: Kasier Gilbert  YOB: 1965  Date of Visit: 10/27/2023      To Whom it May Concern:    [facility] is under my professional care. Wong Stern may return to work on 10/30/23. If you have any questions or concerns, please don't hesitate to call.          Sincerely,          Pietro Nunez MD

## 2023-10-27 NOTE — TELEPHONE ENCOUNTER
Left message for patient to call the office back to let us know when she has been out of work and what her job description is.

## 2023-11-08 ENCOUNTER — HOSPITAL ENCOUNTER (OUTPATIENT)
Dept: CT IMAGING | Facility: HOSPITAL | Age: 58
Discharge: HOME | End: 2023-11-08
Attending: UROLOGY
Payer: COMMERCIAL

## 2023-11-08 DIAGNOSIS — N20.1 URETERAL CALCULUS: ICD-10-CM

## 2023-11-08 PROCEDURE — 74176 CT ABD & PELVIS W/O CONTRAST: CPT

## 2023-11-09 RX ORDER — NALOXONE HYDROCHLORIDE 4 MG/.1ML
SPRAY NASAL
COMMUNITY
Start: 2023-10-25

## 2023-11-15 ENCOUNTER — OFFICE VISIT (OUTPATIENT)
Dept: UROLOGY | Facility: CLINIC | Age: 58
End: 2023-11-15
Payer: COMMERCIAL

## 2023-11-15 VITALS
HEIGHT: 64 IN | OXYGEN SATURATION: 99 % | BODY MASS INDEX: 36.02 KG/M2 | TEMPERATURE: 97 F | DIASTOLIC BLOOD PRESSURE: 66 MMHG | SYSTOLIC BLOOD PRESSURE: 118 MMHG | HEART RATE: 71 BPM | WEIGHT: 211 LBS

## 2023-11-15 DIAGNOSIS — N20.0 CALCULUS OF KIDNEY: Primary | ICD-10-CM

## 2023-11-15 PROCEDURE — 52310 CYSTOSCOPY AND TREATMENT: CPT | Performed by: UROLOGY

## 2023-11-15 PROCEDURE — 99024 POSTOP FOLLOW-UP VISIT: CPT | Performed by: UROLOGY

## 2023-11-15 RX ORDER — GLIMEPIRIDE 1 MG/1
2 TABLET ORAL
COMMUNITY
Start: 2023-11-08

## 2023-11-15 NOTE — PROGRESS NOTES
Cystoscopy     Date/Time  11/15/2023 10:30 AM     Performed by  Barrie Mccarty MD   Authorized by  Barrie Mccarty MD     Universal Protocol:  Consent: Verbal consent obtained. Written consent obtained. Risks and benefits: risks, benefits and alternatives were discussed  Consent given by: patient  Time out: Immediately prior to procedure a "time out" was called to verify the correct patient, procedure, equipment, support staff and site/side marked as required. Timeout called at: 11/15/2023 10:49 AM.  Patient identity confirmed: verbally with patient      Procedure Details:  Procedure type: simple removal of a foreign body, stone, or stent    Patient tolerance: Patient tolerated the procedure well with no immediate complications    Additional Procedure Details: Patient underwent flexible digital cystoscopy in the dorsolithotomy position. Prepped with Betadine lidocaine jelly and a dose of Cipro. Cystoscopy revealed a normal urethra and normal bladder with the exception of a stent coming from the left orifice. The stent was removed with grasping forceps. No complications. No tumors noted. Patient noted procedure well.

## 2023-11-15 NOTE — PROGRESS NOTES
UROLOGY PROGRESS NOTE         NAME: Leatha Friday  AGE: 60 y.o. SEX: female  : 1965   MRN: [id number]    DATE: 11/15/2023  TIME: 10:50 AM    Assessment and Plan      Impression:   1. Calculus of kidney  -     Cystoscopy    Patient has a stone fragment along the stent and the stone fragment in the kidney. Stent in good position. Stent removed successfully today. Well-tolerated.  Plan: 1 dose of Cipro. Patient will start straining her urine. She will hydrate well. We will see her in a month with a renal ultrasound sooner if she has troubles. She should be able to pass the remaining fragments. Chief Complaint     Chief Complaint   Patient presents with    Follow-up    cysto/stent removal     History of Present Illness     HPI: Leatha Friday is a 60y.o. year old female who presents with status post cystoscopy with left ureteroscopy laser lithotripsy stone extraction and fragmentation. Recent CAT scan shows a small fragment along the stent and in the kidney good position of the stent and no hydronephrosis. Patient's been doing well clinically. No fever chills nausea vomiting or pain. She has been back to work. Here for cystoscopy stent removal.  Findings discussed with patient.               The following portions of the patient's history were reviewed and updated as appropriate: allergies, current medications, past family history, past medical history, past social history, past surgical history and problem list.  Past Medical History:   Diagnosis Date    Diabetes mellitus ([address])     Kidney stones      Past Surgical History:   Procedure Laterality Date     SECTION      [place] CYSTO/URETERO W/LITHOTRIPSY &INDWELL STENT INSRT Left 10/25/2023    Procedure: CYSTOSCOPY URETEROSCOPY WITH LITHOTRIPSY HOLMIUM LASER, RETROGRADE PYELOGRAM AND INSERTION STENT URETERAL;  Surgeon: Ethan Finch MD;  Location:  MAIN OR;  Service: Urology    WISDOM TOOTH EXTRACTION       shoulder  Review of Systems     Const: Denies chills, fever and weight loss. CV: Denies chest pain. Resp: Denies SOB. GI: Denies abdominal pain, nausea and vomiting. : Denies symptoms other than stated above. Musculo: Denies back pain. Objective   /66   Pulse 71   Temp (!) 97 °F (36.1 °C)   Ht 5' 4" (1.626 m)   Wt 95.7 kg (211 lb)   LMP 09/14/2020 (Within Days)   SpO2 99%   BMI 36.22 kg/m²     Physical Exam  Const: Appears healthy and well developed. No signs of acute distress present. Resp: Respirations are regular and unlabored. CV: Rate is regular. Rhythm is regular. Abdomen: Abdomen is soft, nontender, and nondistended. Kidneys are not palpable. : Normal pelvic exam.  Psych: Patient's attitude is cooperative.  Mood is normal. Affect is normal.    Current Medications     Current Outpatient Medications:     Biotin 1 MG CAPS, Take 1 mg by mouth, Disp: , Rfl:     Cranberry 475 MG CAPS, Take by mouth, Disp: , Rfl:     glimepiride (AMARYL) 1 mg tablet, Take 2 mg by mouth daily with breakfast, Disp: , Rfl:     ibuprofen (MOTRIN) 200 mg tablet, Take by mouth every 6 (six) hours as needed for mild pain, Disp: , Rfl:     metFORMIN (GLUCOPHAGE-XR) 750 mg 24 hr tablet, Take 500 mg by mouth in the morning, Disp: , Rfl:     Multiple Vitamins-Minerals (Multivitamin Adult) TABS, Take by mouth, Disp: , Rfl:     furosemide (LASIX) 20 mg tablet, Take 20 mg by mouth daily as needed (Patient not taking: Reported on 9/16/2021), Disp: , Rfl:     glimepiride (AMARYL) 2 mg tablet, Take 0.5 mg by mouth daily  (Patient not taking: Reported on 11/15/2023), Disp: , Rfl:     naloxone (NARCAN) 4 mg/0.1 mL nasal spray, , Disp: , Rfl:     oxyCODONE-acetaminophen (PERCOCET) 5-325 mg per tablet, Take 1-2 tablets by mouth every 6 (six) hours as needed for moderate pain or severe pain Max Daily Amount: 8 tablets (Patient not taking: Reported on 11/15/2023), Disp: 20 tablet, Rfl: 0        Renee Ashraf MD

## 2023-12-15 ENCOUNTER — HOSPITAL ENCOUNTER (OUTPATIENT)
Dept: ULTRASOUND IMAGING | Facility: HOSPITAL | Age: 58
End: 2023-12-15
Attending: UROLOGY
Payer: COMMERCIAL

## 2023-12-15 DIAGNOSIS — N20.0 CALCULUS OF KIDNEY: ICD-10-CM

## 2023-12-15 PROCEDURE — 76775 US EXAM ABDO BACK WALL LIM: CPT

## 2023-12-22 ENCOUNTER — OFFICE VISIT (OUTPATIENT)
Dept: UROLOGY | Facility: CLINIC | Age: 58
End: 2023-12-22
Payer: COMMERCIAL

## 2023-12-22 VITALS
TEMPERATURE: 98 F | HEIGHT: 64 IN | WEIGHT: 213.8 LBS | BODY MASS INDEX: 36.5 KG/M2 | OXYGEN SATURATION: 97 % | SYSTOLIC BLOOD PRESSURE: 122 MMHG | DIASTOLIC BLOOD PRESSURE: 66 MMHG | HEART RATE: 71 BPM

## 2023-12-22 DIAGNOSIS — N20.0 CALCULUS OF KIDNEY: Primary | ICD-10-CM

## 2023-12-22 PROCEDURE — 99213 OFFICE O/P EST LOW 20 MIN: CPT | Performed by: UROLOGY

## 2023-12-22 NOTE — PROGRESS NOTES
UROLOGY PROGRESS NOTE         NAME: Michelle Wilson  AGE: 58 y.o. SEX: female  : 1965   MRN: [id number]    DATE: 2023  TIME: 3:30 PM    Assessment and Plan      Impression:   1. Calculus of kidney    History of ureteroscopy laser lithotripsy.  Patient is doing well.  Has a 4 mm remaining stone in her left lower pole.  No hydronephrosis.     Plan: She will continue to hydrate well.  Will see her in 6 months with a ultrasound.  Sooner if any troubles      Chief Complaint     Chief Complaint   Patient presents with    Follow-up     History of Present Illness     HPI: Michelle Wilson is a 58 y.o. year old female who presents with status post laser lithotripsy large stone.  Stent removed.  Patient is doing well clinically no pain no nausea or vomiting.  Urine is clear no dysuria.  No hematuria.  No pain.  Recent ultrasound shows a 4 mm stone in lower pole.  No hydronephrosis.  Parapelvic cyst on the right.  She will try to hydrate better.  5-6 bottles of liquid per day.  Water being the best.              The following portions of the patient's history were reviewed and updated as appropriate: allergies, current medications, past family history, past medical history, past social history, past surgical history and problem list.  Past Medical History:   Diagnosis Date    Diabetes mellitus (HCC)     Kidney stones      Past Surgical History:   Procedure Laterality Date     SECTION      [place] CYSTO/URETERO W/LITHOTRIPSY &INDWELL STENT INSRT Left 10/25/2023    Procedure: CYSTOSCOPY URETEROSCOPY WITH LITHOTRIPSY HOLMIUM LASER, RETROGRADE PYELOGRAM AND INSERTION STENT URETERAL;  Surgeon: Frank D'Amico, MD;  Location:  MAIN OR;  Service: Urology    WISDOM TOOTH EXTRACTION       shoulder  Review of Systems     Const: Denies chills, fever and weight loss.  CV: Denies chest pain.  Resp: Denies SOB.  GI: Denies abdominal pain, nausea and vomiting.  : Denies symptoms other than stated above.  Musculo: Denies  "back pain.    Objective   /66   Pulse 71   Temp 98 °F (36.7 °C)   Ht 5' 4\" (1.626 m)   Wt 97 kg (213 lb 12.8 oz)   LMP 09/14/2020 (Within Days)   SpO2 97%   BMI 36.70 kg/m²     Physical Exam  Const: Appears healthy and well developed. No signs of acute distress present.  Resp: Respirations are regular and unlabored.   CV: Rate is regular. Rhythm is regular.  Abdomen: Abdomen is soft, nontender, and nondistended. Kidneys are not palpable.  : No CVA tenderness no lower quadrant tenderness  Psych: Patient's attitude is cooperative. Mood is normal. Affect is normal.    Current Medications     Current Outpatient Medications:     Biotin 1 MG CAPS, Take 1 mg by mouth, Disp: , Rfl:     Cranberry 475 MG CAPS, Take by mouth, Disp: , Rfl:     glimepiride (AMARYL) 1 mg tablet, Take 2 mg by mouth daily with breakfast, Disp: , Rfl:     ibuprofen (MOTRIN) 200 mg tablet, Take by mouth every 6 (six) hours as needed for mild pain, Disp: , Rfl:     metFORMIN (GLUCOPHAGE-XR) 750 mg 24 hr tablet, Take 500 mg by mouth in the morning, Disp: , Rfl:     Multiple Vitamins-Minerals (Multivitamin Adult) TABS, Take by mouth, Disp: , Rfl:     furosemide (LASIX) 20 mg tablet, Take 20 mg by mouth daily as needed (Patient not taking: Reported on 9/16/2021), Disp: , Rfl:     glimepiride (AMARYL) 2 mg tablet, Take 0.5 mg by mouth daily  (Patient not taking: Reported on 11/15/2023), Disp: , Rfl:     naloxone (NARCAN) 4 mg/0.1 mL nasal spray, , Disp: , Rfl:     oxyCODONE-acetaminophen (PERCOCET) 5-325 mg per tablet, Take 1-2 tablets by mouth every 6 (six) hours as needed for moderate pain or severe pain Max Daily Amount: 8 tablets (Patient not taking: Reported on 11/15/2023), Disp: 20 tablet, Rfl: 0        Frank D'Amico, MD        "

## 2024-05-31 ENCOUNTER — OFFICE VISIT (OUTPATIENT)
Dept: URGENT CARE | Facility: CLINIC | Age: 59
End: 2024-05-31
Payer: COMMERCIAL

## 2024-05-31 VITALS
HEART RATE: 65 BPM | TEMPERATURE: 97.2 F | SYSTOLIC BLOOD PRESSURE: 143 MMHG | WEIGHT: 223.2 LBS | HEIGHT: 65 IN | DIASTOLIC BLOOD PRESSURE: 70 MMHG | OXYGEN SATURATION: 98 % | BODY MASS INDEX: 37.19 KG/M2 | RESPIRATION RATE: 16 BRPM

## 2024-05-31 DIAGNOSIS — T14.8XXA OPEN WOUND OF SKIN: Primary | ICD-10-CM

## 2024-05-31 PROCEDURE — G0382 LEV 3 HOSP TYPE B ED VISIT: HCPCS | Performed by: PHYSICIAN ASSISTANT

## 2024-05-31 PROCEDURE — S9083 URGENT CARE CENTER GLOBAL: HCPCS | Performed by: PHYSICIAN ASSISTANT

## 2024-05-31 NOTE — PROGRESS NOTES
[facility]        NAME: Michelle Wilson is a 58 y.o. female  : 1965    MRN: [id number]  DATE: May 31, 2024  TIME: 10:11 AM    Assessment and Plan   Open wound of skin [T14.8XXA]  1. Open wound of skin  mupirocin (BACTROBAN) 2 % ointment            Patient Instructions   Take antibiotic as prescribed.  Complete full dose of antibiotics even if symptoms begin to improve or resolve.  Over-the-counter Tylenol and ibuprofen as needed for pain and fever.  Observe for signs of worsening infection including increased swelling, redness, pain, discharge, fever or chills, or persistent symptoms.  Your symptoms should begin to improve over the next couple days.    Follow-up with your PCP in 3-5 days if symptoms worsen or do not improve.  Go to ER if symptoms become severe.      Follow up with PCP in 3-5 days.  Proceed to  ER if symptoms worsen.    If tests have been performed at [facility], our office will contact you with results if changes need to be made to the care plan discussed with you at the visit.  You can review your full results on [facility] MyChart.    Chief Complaint     Chief Complaint   Patient presents with    Rash     Bug bite on right arm starting Monday.         History of Present Illness       Patient is a 58-year-old female significant past medical history of diabetes since the office complaining of left to right forearm for 5 days.  Thinks it was a spider bite.  States there is some white discharge when she removes the Band-Aid but once he gets dry, the weight goes away.  Denies pain or itching.  Thinks it is improving but was encouraged by her children to be seen due to history of MRSA.        Review of Systems   Review of Systems   Constitutional:  Negative for fever.   Skin:  Positive for wound.         Current Medications       Current Outpatient Medications:     Biotin 1 MG CAPS, Take 1 mg by mouth, Disp: , Rfl:     Cranberry 475 MG CAPS, Take by mouth, Disp: , Rfl:     glimepiride  (AMARYL) 1 mg tablet, Take 2 mg by mouth daily with breakfast, Disp: , Rfl:     ibuprofen (MOTRIN) 200 mg tablet, Take by mouth every 6 (six) hours as needed for mild pain, Disp: , Rfl:     metFORMIN (GLUCOPHAGE-XR) 750 mg 24 hr tablet, Take 500 mg by mouth in the morning, Disp: , Rfl:     Multiple Vitamins-Minerals (Multivitamin Adult) TABS, Take by mouth, Disp: , Rfl:     mupirocin (BACTROBAN) 2 % ointment, Apply topically 3 (three) times a day, Disp: 60 g, Rfl: 0    furosemide (LASIX) 20 mg tablet, Take 20 mg by mouth daily as needed (Patient not taking: Reported on 2021), Disp: , Rfl:     glimepiride (AMARYL) 2 mg tablet, Take 0.5 mg by mouth daily  (Patient not taking: Reported on 11/15/2023), Disp: , Rfl:     naloxone (NARCAN) 4 mg/0.1 mL nasal spray, , Disp: , Rfl:     oxyCODONE-acetaminophen (PERCOCET) 5-325 mg per tablet, Take 1-2 tablets by mouth every 6 (six) hours as needed for moderate pain or severe pain Max Daily Amount: 8 tablets (Patient not taking: Reported on 11/15/2023), Disp: 20 tablet, Rfl: 0    Current Allergies     Allergies as of 2024    (No Known Allergies)            The following portions of the patient's history were reviewed and updated as appropriate: allergies, current medications, past family history, past medical history, past social history, past surgical history and problem list.     Past Medical History:   Diagnosis Date    Diabetes mellitus (HCC)     Kidney stones        Past Surgical History:   Procedure Laterality Date     SECTION      SD CYSTO/URETERO W/LITHOTRIPSY &INDWELL STENT INSRT Left 10/25/2023    Procedure: CYSTOSCOPY URETEROSCOPY WITH LITHOTRIPSY HOLMIUM LASER, RETROGRADE PYELOGRAM AND INSERTION STENT URETERAL;  Surgeon: Frank D'Amico, MD;  Location:  MAIN OR;  Service: Urology    WISDOM TOOTH EXTRACTION         Family History   Problem Relation Age of Onset    Hernia Mother     No Known Problems Father     No Known Problems Daughter     No Known  "Problems Maternal Grandmother     No Known Problems Maternal Grandfather     No Known Problems Paternal Grandmother     No Known Problems Paternal Grandfather     Breast cancer Neg Hx     Breast cancer additional onset Neg Hx     Colon cancer Neg Hx     Endometrial cancer Neg Hx     Ovarian cancer Neg Hx     BRCA 1/2 Neg Hx     BRCA1 Negative Neg Hx     BRCA1 Positive Neg Hx     BRCA2 Negative Neg Hx     BRCA2 Positive Neg Hx          Medications have been verified.        Objective   /70   Pulse 65   Temp (!) 97.2 °F (36.2 °C)   Resp 16   Ht 5' 4.5\" (1.638 m)   Wt 101 kg (223 lb 3.2 oz)   LMP 09/14/2020 (Within Days)   SpO2 98%   BMI 37.72 kg/m²   Patient's last menstrual period was 09/14/2020 (within days).       Physical Exam     Physical Exam  Vitals and nursing note reviewed.   Constitutional:       Appearance: She is well-developed.   HENT:      Head: Normocephalic and atraumatic.      Right Ear: External ear normal.      Left Ear: External ear normal.      Nose: Nose normal.   Eyes:      General: Lids are normal.      Conjunctiva/sclera: Conjunctivae normal.   Skin:     General: Skin is warm and dry.      Capillary Refill: Capillary refill takes less than 2 seconds.      Findings: Wound (Right forearm.  See image.) present.   Neurological:      Mental Status: She is alert.               Right forearm    "

## 2024-06-18 RX ORDER — METFORMIN HYDROCHLORIDE 500 MG/1
500 TABLET, EXTENDED RELEASE ORAL 2 TIMES DAILY WITH MEALS
COMMUNITY
Start: 2024-05-10

## 2024-06-19 ENCOUNTER — HOSPITAL ENCOUNTER (OUTPATIENT)
Dept: ULTRASOUND IMAGING | Facility: HOSPITAL | Age: 59
Discharge: HOME | End: 2024-06-19
Attending: UROLOGY
Payer: COMMERCIAL

## 2024-06-19 DIAGNOSIS — N20.0 CALCULUS OF KIDNEY: ICD-10-CM

## 2024-06-19 PROCEDURE — 76775 US EXAM ABDO BACK WALL LIM: CPT

## 2024-06-24 ENCOUNTER — TELEPHONE (OUTPATIENT)
Dept: UROLOGY | Facility: CLINIC | Age: 59
End: 2024-06-24

## 2024-07-25 ENCOUNTER — OFFICE VISIT (OUTPATIENT)
Dept: UROLOGY | Facility: CLINIC | Age: 59
End: 2024-07-25
Payer: COMMERCIAL

## 2024-07-25 VITALS
WEIGHT: 220 LBS | DIASTOLIC BLOOD PRESSURE: 66 MMHG | OXYGEN SATURATION: 94 % | TEMPERATURE: 97.2 F | SYSTOLIC BLOOD PRESSURE: 112 MMHG | HEIGHT: 65 IN | BODY MASS INDEX: 36.65 KG/M2

## 2024-07-25 DIAGNOSIS — N20.0 CALCULUS OF KIDNEY: Primary | ICD-10-CM

## 2024-07-25 PROCEDURE — 99214 OFFICE O/P EST MOD 30 MIN: CPT | Performed by: UROLOGY

## 2024-07-25 NOTE — PROGRESS NOTES
UROLOGY PROGRESS NOTE         NAME: Michelle Wilson  AGE: 58 y.o. SEX: female  : 1965   MRN: [id number]    DATE: 2024  TIME: 12:09 PM    Assessment and Plan      Impression:   1. Calculus of kidney    History of a large stone in her left kidney.  She has remaining small stone lower pole that was not accessible.   Plan: She will continue to hydrate as best she can.  Will see her in 1 year with a renal ultrasound again.  Sooner if she has troubles.      Chief Complaint     Chief Complaint   Patient presents with    Follow-up     History of Present Illness     HPI: Michelle Wilson is a 58 y.o. year old female who presents with history of his stones.  We recently treated 1.7 cm stone in her kidney last October.  Patient has appeared to pass all of this fragments.  She has a small stone remaining in the lower pole that was present 6 months ago and has not changed.  No hydronephrosis.  No tumors.  The patient is trying to hydrate better but struggles with this.  No hematuria no incontinence.  Patient is doing well clinically.              The following portions of the patient's history were reviewed and updated as appropriate: allergies, current medications, past family history, past medical history, past social history, past surgical history and problem list.  Past Medical History:   Diagnosis Date    Diabetes mellitus (HCC)     History of MRSA infection     Kidney stones      Past Surgical History:   Procedure Laterality Date     SECTION      NH CYSTO/URETERO W/LITHOTRIPSY &INDWELL STENT INSRT Left 10/25/2023    Procedure: CYSTOSCOPY URETEROSCOPY WITH LITHOTRIPSY HOLMIUM LASER, RETROGRADE PYELOGRAM AND INSERTION STENT URETERAL;  Surgeon: Frank D'Amico, MD;  Location:  MAIN OR;  Service: Urology    WISDOM TOOTH EXTRACTION       shoulder  Review of Systems     Const: Denies chills, fever and weight loss.  CV: Denies chest pain.  Resp: Denies SOB.  GI: Denies abdominal pain, nausea and  "vomiting.  : Denies symptoms other than stated above.  Musculo: Denies back pain.    Objective   /66   Temp (!) 97.2 °F (36.2 °C)   Ht 5' 4.5\" (1.638 m)   Wt 99.8 kg (220 lb)   LMP 09/14/2020 (Within Days)   SpO2 94%   BMI 37.18 kg/m²     Physical Exam  Const: Appears healthy and well developed. No signs of acute distress present.  Resp: Respirations are regular and unlabored.   CV: Rate is regular. Rhythm is regular.  Abdomen: Abdomen is soft, nontender, and nondistended. Kidneys are not palpable.  : No CVA tenderness  Psych: Patient's attitude is cooperative. Mood is normal. Affect is normal.    Current Medications     Current Outpatient Medications:     Biotin 1 MG CAPS, Take 1 mg by mouth, Disp: , Rfl:     Cranberry 475 MG CAPS, Take by mouth, Disp: , Rfl:     glimepiride (AMARYL) 1 mg tablet, Take 1 mg by mouth daily with breakfast, Disp: , Rfl:     metFORMIN (GLUCOPHAGE-XR) 500 mg 24 hr tablet, Take 500 mg by mouth daily with breakfast, Disp: , Rfl:     Multiple Vitamins-Minerals (Multivitamin Adult) TABS, Take by mouth, Disp: , Rfl:     mupirocin (BACTROBAN) 2 % ointment, Apply topically 3 (three) times a day, Disp: 60 g, Rfl: 0    furosemide (LASIX) 20 mg tablet, Take 20 mg by mouth daily as needed (Patient not taking: Reported on 9/16/2021), Disp: , Rfl:         Frank D'Amico, MD        "

## 2024-10-10 ENCOUNTER — HOSPITAL ENCOUNTER (OUTPATIENT)
Dept: RADIOLOGY | Facility: CLINIC | Age: 59
End: 2024-10-10
Payer: COMMERCIAL

## 2024-10-10 VITALS — HEIGHT: 65 IN | WEIGHT: 220 LBS | BODY MASS INDEX: 36.65 KG/M2

## 2024-10-10 DIAGNOSIS — Z12.31 ENCOUNTER FOR SCREENING MAMMOGRAM FOR MALIGNANT NEOPLASM OF BREAST: ICD-10-CM

## 2024-10-10 PROCEDURE — 77063 BREAST TOMOSYNTHESIS BI: CPT

## 2024-10-10 PROCEDURE — 77067 SCR MAMMO BI INCL CAD: CPT

## 2025-07-16 ENCOUNTER — HOSPITAL ENCOUNTER (OUTPATIENT)
Dept: ULTRASOUND IMAGING | Facility: HOSPITAL | Age: 60
Discharge: HOME | End: 2025-07-16
Attending: UROLOGY
Payer: COMMERCIAL

## 2025-07-16 DIAGNOSIS — N20.0 CALCULUS OF KIDNEY: ICD-10-CM

## 2025-07-16 PROCEDURE — 76775 US EXAM ABDO BACK WALL LIM: CPT

## 2025-08-06 ENCOUNTER — RESULTS FOLLOW-UP (OUTPATIENT)
Dept: UROLOGY | Facility: CLINIC | Age: 60
End: 2025-08-06

## 2025-08-20 ENCOUNTER — OFFICE VISIT (OUTPATIENT)
Dept: UROLOGY | Facility: CLINIC | Age: 60
End: 2025-08-20
Payer: COMMERCIAL

## 2025-08-20 VITALS
SYSTOLIC BLOOD PRESSURE: 124 MMHG | OXYGEN SATURATION: 96 % | WEIGHT: 224.6 LBS | HEIGHT: 65 IN | TEMPERATURE: 97.9 F | BODY MASS INDEX: 37.42 KG/M2 | DIASTOLIC BLOOD PRESSURE: 80 MMHG | HEART RATE: 76 BPM

## 2025-08-20 DIAGNOSIS — N20.0 CALCULUS OF KIDNEY: Primary | ICD-10-CM

## 2025-08-20 PROCEDURE — 99213 OFFICE O/P EST LOW 20 MIN: CPT | Performed by: UROLOGY

## (undated) DEVICE — PACK TUR

## (undated) DEVICE — FIBER HOLMIUM MP 200 MICRON SMARTSCOPE

## (undated) DEVICE — URO CATCHER BAG STERILE 0-UC32

## (undated) DEVICE — SHEATH URETERAL ACCESS 12/14FR 35CM PROXIS

## (undated) DEVICE — STERILE SURGICAL LUBRICANT,  TUBE: Brand: SURGILUBE

## (undated) DEVICE — SINGLE-USE DIGITAL FLEXIBLE URETEROSCOPE: Brand: APTRA

## (undated) DEVICE — GUIDEWIRE STRGHT TIP 0.035 IN  SOLO PLUS

## (undated) DEVICE — TUBING SUCTION 5MM X 12 FT

## (undated) DEVICE — CHLORHEXIDINE 4PCT 4 OZ

## (undated) DEVICE — SINGLE PORT MANIFOLD: Brand: NEPTUNE 2

## (undated) DEVICE — SCD SEQUENTIAL COMPRESSION COMFORT SLEEVE MEDIUM KNEE LENGTH: Brand: KENDALL SCD

## (undated) DEVICE — GLOVE SRG BIOGEL 6.5

## (undated) DEVICE — DISPOSABLE OR TOWEL: Brand: CARDINAL HEALTH

## (undated) DEVICE — PREMIUM DRY TRAY LF: Brand: MEDLINE INDUSTRIES, INC.

## (undated) DEVICE — ENDOSCOPIC VALVE WITH ADAPTER.: Brand: SURSEAL® II

## (undated) DEVICE — CYSTO TUBING TUR Y IRRIGATION